# Patient Record
Sex: FEMALE | Race: WHITE | NOT HISPANIC OR LATINO | Employment: FULL TIME | URBAN - METROPOLITAN AREA
[De-identification: names, ages, dates, MRNs, and addresses within clinical notes are randomized per-mention and may not be internally consistent; named-entity substitution may affect disease eponyms.]

---

## 2021-10-16 ENCOUNTER — HOSPITAL ENCOUNTER (EMERGENCY)
Facility: HOSPITAL | Age: 21
Discharge: HOME | End: 2021-10-17
Attending: EMERGENCY MEDICINE
Payer: MEDICAID

## 2021-10-16 ENCOUNTER — APPOINTMENT (EMERGENCY)
Dept: RADIOLOGY | Facility: HOSPITAL | Age: 21
End: 2021-10-16
Payer: MEDICAID

## 2021-10-16 VITALS
OXYGEN SATURATION: 100 % | RESPIRATION RATE: 16 BRPM | TEMPERATURE: 98.6 F | WEIGHT: 129 LBS | HEART RATE: 84 BPM | SYSTOLIC BLOOD PRESSURE: 116 MMHG | DIASTOLIC BLOOD PRESSURE: 55 MMHG

## 2021-10-16 DIAGNOSIS — N39.0 UTI (URINARY TRACT INFECTION): ICD-10-CM

## 2021-10-16 DIAGNOSIS — R10.9 ABDOMINAL PAIN: Primary | ICD-10-CM

## 2021-10-16 DIAGNOSIS — B37.3 VAGINAL CANDIDIASIS: ICD-10-CM

## 2021-10-16 LAB
ALBUMIN SERPL BCP-MCNC: 4.7 G/DL (ref 3.5–5)
ALP SERPL-CCNC: 51 U/L (ref 46–116)
ALT SERPL W P-5'-P-CCNC: 25 U/L (ref 12–78)
ANION GAP SERPL CALCULATED.3IONS-SCNC: 13 MMOL/L (ref 4–13)
AST SERPL W P-5'-P-CCNC: 24 U/L (ref 5–45)
BACTERIA UR QL AUTO: ABNORMAL /HPF
BASOPHILS # BLD AUTO: 0.03 THOUSANDS/ΜL (ref 0–0.1)
BASOPHILS NFR BLD AUTO: 0 % (ref 0–1)
BILIRUB SERPL-MCNC: 0.3 MG/DL (ref 0.2–1)
BILIRUB UR QL STRIP: NEGATIVE
BUN SERPL-MCNC: 9 MG/DL (ref 5–25)
CALCIUM SERPL-MCNC: 9.3 MG/DL (ref 8.3–10.1)
CHLORIDE SERPL-SCNC: 104 MMOL/L (ref 100–108)
CLARITY UR: ABNORMAL
CO2 SERPL-SCNC: 24 MMOL/L (ref 21–32)
COLOR UR: YELLOW
CREAT SERPL-MCNC: 0.67 MG/DL (ref 0.6–1.3)
EOSINOPHIL # BLD AUTO: 0.13 THOUSAND/ΜL (ref 0–0.61)
EOSINOPHIL NFR BLD AUTO: 1 % (ref 0–6)
ERYTHROCYTE [DISTWIDTH] IN BLOOD BY AUTOMATED COUNT: 12.1 % (ref 11.6–15.1)
EXT PREG TEST URINE: NEGATIVE
EXT. CONTROL ED NAV: NORMAL
GFR SERPL CREATININE-BSD FRML MDRD: 127 ML/MIN/1.73SQ M
GLUCOSE SERPL-MCNC: 105 MG/DL (ref 65–140)
GLUCOSE UR STRIP-MCNC: NEGATIVE MG/DL
HCT VFR BLD AUTO: 44.4 % (ref 34.8–46.1)
HGB BLD-MCNC: 14.6 G/DL (ref 11.5–15.4)
HGB UR QL STRIP.AUTO: NEGATIVE
IMM GRANULOCYTES # BLD AUTO: 0.02 THOUSAND/UL (ref 0–0.2)
IMM GRANULOCYTES NFR BLD AUTO: 0 % (ref 0–2)
KETONES UR STRIP-MCNC: NEGATIVE MG/DL
LEUKOCYTE ESTERASE UR QL STRIP: ABNORMAL
LIPASE SERPL-CCNC: 107 U/L (ref 73–393)
LYMPHOCYTES # BLD AUTO: 3.31 THOUSANDS/ΜL (ref 0.6–4.47)
LYMPHOCYTES NFR BLD AUTO: 31 % (ref 14–44)
MAGNESIUM SERPL-MCNC: 2.4 MG/DL (ref 1.6–2.6)
MCH RBC QN AUTO: 30.6 PG (ref 26.8–34.3)
MCHC RBC AUTO-ENTMCNC: 32.9 G/DL (ref 31.4–37.4)
MCV RBC AUTO: 93 FL (ref 82–98)
MONOCYTES # BLD AUTO: 0.56 THOUSAND/ΜL (ref 0.17–1.22)
MONOCYTES NFR BLD AUTO: 5 % (ref 4–12)
MUCOUS THREADS UR QL AUTO: ABNORMAL
NEUTROPHILS # BLD AUTO: 6.68 THOUSANDS/ΜL (ref 1.85–7.62)
NEUTS SEG NFR BLD AUTO: 63 % (ref 43–75)
NITRITE UR QL STRIP: NEGATIVE
NON-SQ EPI CELLS URNS QL MICRO: ABNORMAL /HPF
NRBC BLD AUTO-RTO: 0 /100 WBCS
PH UR STRIP.AUTO: 7 [PH]
PLATELET # BLD AUTO: 231 THOUSANDS/UL (ref 149–390)
PMV BLD AUTO: 11 FL (ref 8.9–12.7)
POTASSIUM SERPL-SCNC: 4 MMOL/L (ref 3.5–5.3)
PROT SERPL-MCNC: 8.1 G/DL (ref 6.4–8.2)
PROT UR STRIP-MCNC: NEGATIVE MG/DL
RBC # BLD AUTO: 4.77 MILLION/UL (ref 3.81–5.12)
RBC #/AREA URNS AUTO: ABNORMAL /HPF
SODIUM SERPL-SCNC: 141 MMOL/L (ref 136–145)
SP GR UR STRIP.AUTO: 1.01 (ref 1–1.03)
UROBILINOGEN UR QL STRIP.AUTO: 0.2 E.U./DL
WBC # BLD AUTO: 10.73 THOUSAND/UL (ref 4.31–10.16)
WBC #/AREA URNS AUTO: ABNORMAL /HPF

## 2021-10-16 PROCEDURE — 99284 EMERGENCY DEPT VISIT MOD MDM: CPT | Performed by: EMERGENCY MEDICINE

## 2021-10-16 PROCEDURE — 83690 ASSAY OF LIPASE: CPT | Performed by: EMERGENCY MEDICINE

## 2021-10-16 PROCEDURE — 74177 CT ABD & PELVIS W/CONTRAST: CPT

## 2021-10-16 PROCEDURE — 96361 HYDRATE IV INFUSION ADD-ON: CPT

## 2021-10-16 PROCEDURE — 99284 EMERGENCY DEPT VISIT MOD MDM: CPT

## 2021-10-16 PROCEDURE — 83735 ASSAY OF MAGNESIUM: CPT | Performed by: EMERGENCY MEDICINE

## 2021-10-16 PROCEDURE — G1004 CDSM NDSC: HCPCS

## 2021-10-16 PROCEDURE — 80053 COMPREHEN METABOLIC PANEL: CPT | Performed by: EMERGENCY MEDICINE

## 2021-10-16 PROCEDURE — 96374 THER/PROPH/DIAG INJ IV PUSH: CPT

## 2021-10-16 PROCEDURE — 96375 TX/PRO/DX INJ NEW DRUG ADDON: CPT

## 2021-10-16 PROCEDURE — 81025 URINE PREGNANCY TEST: CPT | Performed by: EMERGENCY MEDICINE

## 2021-10-16 PROCEDURE — 81001 URINALYSIS AUTO W/SCOPE: CPT | Performed by: EMERGENCY MEDICINE

## 2021-10-16 PROCEDURE — 85025 COMPLETE CBC W/AUTO DIFF WBC: CPT | Performed by: EMERGENCY MEDICINE

## 2021-10-16 PROCEDURE — 36415 COLL VENOUS BLD VENIPUNCTURE: CPT

## 2021-10-16 RX ORDER — NITROFURANTOIN 25; 75 MG/1; MG/1
100 CAPSULE ORAL 2 TIMES DAILY
Qty: 10 CAPSULE | Refills: 0 | Status: SHIPPED | OUTPATIENT
Start: 2021-10-16

## 2021-10-16 RX ORDER — FLUCONAZOLE 100 MG/1
200 TABLET ORAL ONCE
Status: COMPLETED | OUTPATIENT
Start: 2021-10-16 | End: 2021-10-16

## 2021-10-16 RX ORDER — KETOROLAC TROMETHAMINE 30 MG/ML
15 INJECTION, SOLUTION INTRAMUSCULAR; INTRAVENOUS ONCE
Status: COMPLETED | OUTPATIENT
Start: 2021-10-16 | End: 2021-10-16

## 2021-10-16 RX ORDER — NORETHINDRONE ACETATE AND ETHINYL ESTRADIOL 1MG-20(21)
1 KIT ORAL DAILY
COMMUNITY

## 2021-10-16 RX ORDER — NITROFURANTOIN 25; 75 MG/1; MG/1
100 CAPSULE ORAL ONCE
Status: COMPLETED | OUTPATIENT
Start: 2021-10-16 | End: 2021-10-16

## 2021-10-16 RX ORDER — ONDANSETRON 2 MG/ML
4 INJECTION INTRAMUSCULAR; INTRAVENOUS ONCE
Status: COMPLETED | OUTPATIENT
Start: 2021-10-16 | End: 2021-10-16

## 2021-10-16 RX ADMIN — NITROFURANTOIN (MONOHYDRATE/MACROCRYSTALS) 100 MG: 75; 25 CAPSULE ORAL at 23:24

## 2021-10-16 RX ADMIN — IOHEXOL 100 ML: 350 INJECTION, SOLUTION INTRAVENOUS at 22:09

## 2021-10-16 RX ADMIN — FLUCONAZOLE 200 MG: 100 TABLET ORAL at 21:38

## 2021-10-16 RX ADMIN — ONDANSETRON 4 MG: 2 INJECTION INTRAMUSCULAR; INTRAVENOUS at 20:03

## 2021-10-16 RX ADMIN — KETOROLAC TROMETHAMINE 15 MG: 30 INJECTION, SOLUTION INTRAMUSCULAR; INTRAVENOUS at 20:38

## 2021-10-16 RX ADMIN — SODIUM CHLORIDE 1000 ML: 0.9 INJECTION, SOLUTION INTRAVENOUS at 19:58

## 2024-04-25 ENCOUNTER — OFFICE VISIT (OUTPATIENT)
Dept: URGENT CARE | Facility: CLINIC | Age: 24
End: 2024-04-25
Payer: MEDICAID

## 2024-04-25 VITALS
RESPIRATION RATE: 18 BRPM | HEIGHT: 64 IN | OXYGEN SATURATION: 99 % | DIASTOLIC BLOOD PRESSURE: 64 MMHG | BODY MASS INDEX: 21.17 KG/M2 | WEIGHT: 124 LBS | TEMPERATURE: 97 F | SYSTOLIC BLOOD PRESSURE: 119 MMHG | HEART RATE: 93 BPM

## 2024-04-25 DIAGNOSIS — J02.9 PHARYNGITIS, UNSPECIFIED ETIOLOGY: Primary | ICD-10-CM

## 2024-04-25 LAB — S PYO AG THROAT QL: NEGATIVE

## 2024-04-25 PROCEDURE — 99203 OFFICE O/P NEW LOW 30 MIN: CPT | Performed by: PHYSICIAN ASSISTANT

## 2024-04-25 PROCEDURE — 87880 STREP A ASSAY W/OPTIC: CPT | Performed by: PHYSICIAN ASSISTANT

## 2024-04-25 NOTE — PROGRESS NOTES
[facility]        NAME: Kimmy Whitaker is a 23 y.o. female  : 2000    MRN: [id number]  DATE: 2024  TIME: 7:37 PM    Assessment and Plan   Pharyngitis, unspecified etiology [J02.9]  1. Pharyngitis, unspecified etiology  POCT rapid ANTIGEN strepA    Upper Respiratory Culture        Neg strep, will send culture because of exposures at work. Discussed importance of staying hydrated. Discussed supportive care and otc meds for symptomatic relief. May use tea with honey and a cool mist humidifier for symptoms. Encouraged salt water gargles if sore throat. Discussed strict return to care precautions as well as red flag symptoms which should prompt immediate ED referral. Pt verbalized understanding and is in agreement with plan.  Please follow up with your primary care provider within the next week. Please remember that your visit today was with an urgent care provider and should not replace follow up with your primary care provider for chronic medical issues or annual physicals.       Patient Instructions       Follow up with PCP in 3-5 days.  Proceed to  ER if symptoms worsen.    If tests are performed, our office will contact you with results only if changes need to made to the care plan discussed with you at the visit. You can review your full results on [facility].    Chief Complaint     Chief Complaint   Patient presents with    Sore Throat     Sore  throat cough x 1 day         History of Present Illness       Kimmy Whitaker is a(n) 23 y.o. female presenting with URI symptoms x 1 days  Past medical history: asthma  Congestion: no  Sore throat: yes  Cough: yes mild  Sputum production: no  Fever: subjective but highest measured was only 99s  Body aches: no  Loss of smell/taste: no  GI symptoms: no  Known sick contacts: yes, works at a   OTC meds tried: tylenol  Also c/o bilateral ear fullness. Hurts to swallow but has no difficulty doing so        Review of Systems   Review  "of Systems   Constitutional:         Negative except as noted in HPI   Respiratory:  Negative for shortness of breath.    Cardiovascular:  Negative for chest pain.         Current Medications       Current Outpatient Medications:     norethindrone-ethinyl estradiol (JUNEL FE 1/20) 1-20 MG-MCG per tablet, Take 1 tablet by mouth daily, Disp: , Rfl:     nitrofurantoin (MACROBID) 100 mg capsule, Take 1 capsule (100 mg total) by mouth 2 (two) times a day (Patient not taking: Reported on 4/25/2024), Disp: 10 capsule, Rfl: 0    Current Allergies     Allergies as of 04/25/2024 - Reviewed 04/25/2024   Allergen Reaction Noted    Penicillins Hives 10/16/2021            The following portions of the patient's history were reviewed and updated as appropriate: allergies, current medications, past family history, past medical history, past social history, past surgical history and problem list.     Past Medical History:   Diagnosis Date    Asthma        History reviewed. No pertinent surgical history.    History reviewed. No pertinent family history.      Medications have been verified.        Objective   /64   Pulse 93   Temp (!) 97 °F (36.1 °C)   Resp 18   Ht 5' 4\" (1.626 m)   Wt 56.2 kg (124 lb)   LMP 03/12/2024   SpO2 99%   BMI 21.28 kg/m²        Physical Exam     Physical Exam  Vitals and nursing note reviewed.   Constitutional:       General: She is not in acute distress.     Appearance: Normal appearance. She is not toxic-appearing.   HENT:      Head: Normocephalic and atraumatic.      Right Ear: Tympanic membrane, ear canal and external ear normal.      Left Ear: Tympanic membrane, ear canal and external ear normal.      Nose: No congestion.      Mouth/Throat:      Mouth: Mucous membranes are moist.      Pharynx: Oropharynx is clear. Uvula midline. Posterior oropharyngeal erythema present. No oropharyngeal exudate.      Tonsils: No tonsillar exudate.   Eyes:      Conjunctiva/sclera: Conjunctivae normal.      " Pupils: Pupils are equal, round, and reactive to light.   Cardiovascular:      Rate and Rhythm: Normal rate and regular rhythm.      Heart sounds: Normal heart sounds.   Pulmonary:      Effort: Pulmonary effort is normal. No respiratory distress.      Breath sounds: Normal breath sounds. No wheezing, rhonchi or rales.   Musculoskeletal:      Cervical back: Normal range of motion and neck supple.   Lymphadenopathy:      Cervical: Cervical adenopathy present.   Skin:     General: Skin is warm and dry.      Capillary Refill: Capillary refill takes less than 2 seconds.   Neurological:      Mental Status: She is alert and oriented to person, place, and time.   Psychiatric:         Behavior: Behavior normal.

## 2024-04-26 ENCOUNTER — TELEPHONE (OUTPATIENT)
Dept: URGENT CARE | Facility: CLINIC | Age: 24
End: 2024-04-26

## 2024-04-26 NOTE — TELEPHONE ENCOUNTER
Pt called requesting test results. Given negative result. Educated tonsillar swelling in negative culture is likely viral. All questions answered.

## 2024-04-29 ENCOUNTER — OFFICE VISIT (OUTPATIENT)
Dept: URGENT CARE | Facility: CLINIC | Age: 24
End: 2024-04-29
Payer: MEDICAID

## 2024-04-29 VITALS
TEMPERATURE: 97 F | WEIGHT: 121.2 LBS | HEIGHT: 64 IN | HEART RATE: 103 BPM | OXYGEN SATURATION: 97 % | BODY MASS INDEX: 20.69 KG/M2 | RESPIRATION RATE: 18 BRPM | DIASTOLIC BLOOD PRESSURE: 61 MMHG | SYSTOLIC BLOOD PRESSURE: 132 MMHG

## 2024-04-29 DIAGNOSIS — H10.31 ACUTE CONJUNCTIVITIS OF RIGHT EYE, UNSPECIFIED ACUTE CONJUNCTIVITIS TYPE: Primary | ICD-10-CM

## 2024-04-29 LAB
BACTERIA SPEC RESP CULT: NORMAL
Lab: NORMAL

## 2024-04-29 PROCEDURE — 99213 OFFICE O/P EST LOW 20 MIN: CPT | Performed by: PHYSICIAN ASSISTANT

## 2024-04-29 RX ORDER — POLYMYXIN B SULFATE AND TRIMETHOPRIM 1; 10000 MG/ML; [USP'U]/ML
1 SOLUTION OPHTHALMIC EVERY 4 HOURS
Qty: 10 ML | Refills: 0 | Status: SHIPPED | OUTPATIENT
Start: 2024-04-29 | End: 2024-05-01

## 2024-04-29 NOTE — PROGRESS NOTES
[facility]        NAME: Kimmy Whitaker is a 23 y.o. female  : 2000    MRN: [id number]  DATE: 2024  TIME: 5:40 PM    Assessment and Plan   Acute conjunctivitis of right eye, unspecified acute conjunctivitis type [H10.31]  1. Acute conjunctivitis of right eye, unspecified acute conjunctivitis type  polymyxin b-trimethoprim (POLYTRIM) ophthalmic solution        Encouraged to wash hands frequently.  Work note provided. Discussed strict return to care precautions as well as red flag symptoms which should prompt immediate ED referral. Pt verbalized understanding and is in agreement with plan.  Please follow up with your primary care provider within the next week. Please remember that your visit today was with an urgent care provider and should not replace follow up with your primary care provider for chronic medical issues or annual physicals.       Patient Instructions       Follow up with PCP in 3-5 days.  Proceed to  ER if symptoms worsen.    If tests are performed, our office will contact you with results only if changes need to made to the care plan discussed with you at the visit. You [facility] on [facility].    Chief Complaint     Chief Complaint   Patient presents with    Cold Like Symptoms     Cold symptoms and conjunctivitis of right eye         History of Present Illness       Patient is a 23-year-old female with PMH asthma presenting with right eye erythema, discharge, irritation x 1 day.  Multiple exposures to pinkeye at work and at home.  States she has had a cough related to allergies for a few weeks but this has not worsened recently and she does not feel it is related to her eye.  Denies fevers, visual changes, headache, dizziness.        Review of Systems   Review of Systems   Constitutional:  Negative for chills, diaphoresis, fatigue and fever.   HENT:  Negative for congestion, ear pain, postnasal drip, rhinorrhea, sinus pain, sneezing, sore throat  "and trouble swallowing.    Eyes:  Positive for photophobia, pain, discharge and redness. Negative for visual disturbance.   Respiratory:  Positive for cough. Negative for chest tightness, shortness of breath and wheezing.    Cardiovascular:  Negative for chest pain and leg swelling.   Gastrointestinal:  Negative for diarrhea, nausea and vomiting.   Musculoskeletal:  Negative for myalgias.   Neurological:  Negative for dizziness, weakness and headaches.         Current Medications       Current Outpatient Medications:     polymyxin b-trimethoprim (POLYTRIM) ophthalmic solution, Administer 1 drop to the right eye every 4 (four) hours for 7 days, Disp: 10 mL, Rfl: 0    nitrofurantoin (MACROBID) 100 mg capsule, Take 1 capsule (100 mg total) by mouth 2 (two) times a day (Patient not taking: Reported on 4/25/2024), Disp: 10 capsule, Rfl: 0    norethindrone-ethinyl estradiol (JUNEL FE 1/20) 1-20 MG-MCG per tablet, Take 1 tablet by mouth daily, Disp: , Rfl:     Current Allergies     Allergies as of 04/29/2024 - Reviewed 04/29/2024   Allergen Reaction Noted    Penicillins Hives 10/16/2021            The following portions of the patient's history were reviewed and updated as appropriate: allergies, current medications, past family history, past medical history, past social history, past surgical history and problem list.     Past Medical History:   Diagnosis Date    Asthma        No past surgical history on file.    No family history on file.      Medications have been verified.        Objective   /61   Pulse 103   Temp (!) 97 °F (36.1 °C)   Resp 18   Ht 5' 4\" (1.626 m)   Wt 55 kg (121 lb 3.2 oz)   LMP 03/12/2024   SpO2 97%   BMI 20.80 kg/m²        Physical Exam     Physical Exam  Vitals and nursing note reviewed.   Constitutional:       General: She is not in acute distress.     Appearance: Normal appearance. She is not ill-appearing.   HENT:      Head: Normocephalic and atraumatic.   Eyes:      General: Lids " are normal.         Right eye: Discharge present. No hordeolum.         Left eye: No discharge or hordeolum.      Extraocular Movements: Extraocular movements intact.      Conjunctiva/sclera:      Right eye: Right conjunctiva is injected.      Left eye: Left conjunctiva is not injected.      Pupils: Pupils are equal, round, and reactive to light.   Cardiovascular:      Rate and Rhythm: Normal rate.   Pulmonary:      Effort: Pulmonary effort is normal. No respiratory distress.   Skin:     General: Skin is warm and dry.      Capillary Refill: Capillary refill takes less than 2 seconds.   Neurological:      Mental Status: She is alert and oriented to person, place, and time.   Psychiatric:         Behavior: Behavior normal.

## 2024-04-29 NOTE — LETTER
April 29, 2024     Patient: Kimmy Whitaker   YOB: 2000   Date of Visit: 4/29/2024       To Whom it May Concern:    Kimmy Whitaker was seen in my clinic on 4/29/2024. She may return to work on 5/1/2024 .    If you have any questions or concerns, please don't hesitate to call.         Sincerely,          Karuna Nur PA-C        CC: No Recipients

## 2024-05-01 ENCOUNTER — TELEPHONE (OUTPATIENT)
Dept: URGENT CARE | Facility: CLINIC | Age: 24
End: 2024-05-01

## 2024-05-01 DIAGNOSIS — H10.31 ACUTE CONJUNCTIVITIS OF RIGHT EYE, UNSPECIFIED ACUTE CONJUNCTIVITIS TYPE: Primary | ICD-10-CM

## 2024-05-01 RX ORDER — OFLOXACIN 3 MG/ML
1 SOLUTION/ DROPS OPHTHALMIC 4 TIMES DAILY
Qty: 5 ML | Refills: 0 | Status: SHIPPED | OUTPATIENT
Start: 2024-05-01 | End: 2024-05-08

## 2024-05-01 NOTE — TELEPHONE ENCOUNTER
PT CALLED office to speak with MADHAV Beckman: pt states her eyes are worsening, dry crust this morning, pt states not able to open eyes; informed pt to f/u with PCP with symptoms worsening; informed pt that Karuna will call her back to address needs.

## 2024-05-01 NOTE — TELEPHONE ENCOUNTER
Pt called stating her R eye is not getting any better. Advised her that we can try a different eye drop but if no better with this then it is likely either viral or allergic conjunctivitis. If no improvement or any worsening should fu with pcp or eye doctor. Pt verbalizes understanding and is in agreement with plan.

## 2024-12-09 ENCOUNTER — OFFICE VISIT (OUTPATIENT)
Dept: URGENT CARE | Facility: CLINIC | Age: 24
End: 2024-12-09
Payer: COMMERCIAL

## 2024-12-09 VITALS
BODY MASS INDEX: 20.49 KG/M2 | HEART RATE: 98 BPM | WEIGHT: 120 LBS | RESPIRATION RATE: 18 BRPM | HEIGHT: 64 IN | TEMPERATURE: 97.8 F | OXYGEN SATURATION: 100 %

## 2024-12-09 DIAGNOSIS — R42 DIZZINESS AND GIDDINESS: Primary | ICD-10-CM

## 2024-12-09 PROCEDURE — 99213 OFFICE O/P EST LOW 20 MIN: CPT

## 2024-12-09 RX ORDER — MECLIZINE HYDROCHLORIDE 25 MG/1
25 TABLET ORAL EVERY 8 HOURS PRN
Qty: 30 TABLET | Refills: 0 | Status: SHIPPED | OUTPATIENT
Start: 2024-12-09

## 2024-12-09 NOTE — PROGRESS NOTES
Assessment/Plan  EKG reviewed, no acute abnormality noted, awaiting official read.   Blood glucose 84 in office.   Orthostatic vital signs normal.   At this point, strongly recommend follow up with ENT/PCP as soon as possible. May begin Antivert as directed.   Go to ED for worsening symptoms.     Dizziness and giddiness [R42]  1. Dizziness and giddiness  Fingerstick Glucose (POCT)    ECG 12 lead    Ambulatory Referral to Otolaryngology    meclizine (ANTIVERT) 25 mg tablet    Ambulatory Referral to Physical Therapy      Patient Education     Dizziness, Adult ED   General Information   You came to the Emergency Department (ED) for dizziness. Dizziness means different things to different people. For example, you may feel light-headed or like you are about to pass out. You may have trouble walking straight and feel like you are about to fall when you are dizzy. Some people feel as though they are spinning or the world around them is spinning. This spinning feeling is known as vertigo.  Many things can cause you to feel dizzy. Some are serious things like heart problems or a stroke. Less serious things, like getting up too quickly or not drinking enough fluids, can also cause you to feel dizzy. Some medicines can also cause you to feel dizzy.  After seeing you, the doctor feels that the risk of a serious cause for your dizziness right now is low. If the doctor thinks you have vertigo, you may be given medicine to help with your symptoms.  What care is needed at home?   Call your regular doctor to let them know you were in the ED. Make a follow-up appointment if you were told to.  Avoid changing your position too quickly. Take care when moving from sitting to standing.  Sit on the edge of the bed for a few minutes before you stand up. Start to walk slowly after you stand up.  Move your legs often if you need to sit or  one position for a long time.  Be sure to drink enough fluids, even if you do not feel  thirsty.  Sit or lie down right away if you feel faint or dizzy. Take extra care to protect yourself from falls. Avoid driving when dizzy. If you feel dizzy while driving, pull over right away.  If you must walk somewhere when you feel dizzy, you may need to use a cane or walker, or have another person help you so you don’t fall.  When do I need to get emergency help?   Call for an ambulance right away if:  You have new weakness in your arms or legs.  You develop new trouble speaking, swallowing, seeing, or hearing.  You have chest pain, an irregular heartbeat, or trouble breathing.  You have a seizure.  You become unable to walk or stand because of your dizziness.  When do I need to call the doctor?   Your dizziness continues for a long time or gets worse.  You have new or worsening symptoms.  Last Reviewed Date   2020-09-16  Consumer Information Use and Disclaimer   This generalized information is a limited summary of diagnosis, treatment, and/or medication information. It is not meant to be comprehensive and should be used as a tool to help the user understand and/or assess potential diagnostic and treatment options. It does NOT include all information about conditions, treatments, medications, side effects, or risks that may apply to a specific patient. It is not intended to be medical advice or a substitute for the medical advice, diagnosis, or treatment of a health care provider based on the health care provider's examination and assessment of a patient’s specific and unique circumstances. Patients must speak with a health care provider for complete information about their health, medical questions, and treatment options, including any risks or benefits regarding use of medications. This information does not endorse any treatments or medications as safe, effective, or approved for treating a specific patient. [employer] and its affiliates disclaim any warranty or liability relating to this information or  the use thereof. The use of this information is governed by the Terms of Use, available at https://www.wolterskluwer.com/en/know/clinical-effectiveness-terms   Copyright   Copyright © 2024 [employer] and its affiliates and/or licensors. All rights reserved.         Subjective:     Patient ID: Kimmy Whitaker is a 24 y.o. female.      Reason For Visit / Chief Complaint  Chief Complaint   Patient presents with    Dizziness     This am became dizzy then hot and ears began to ring. Now feels nauseated with head hurting         Patient is a 24 year old female with no significant PMH who presents for evaluation of occipital headache and dizziness. She reports that symptoms began on Saturday with headache. This morning, she woke up and felt dizzy, and began experiencing hot flashes, nausea and bilateral tinnitus along with the episodes of dizziness. She has never had these symptoms before, but does note a family history of vertigo. She denies chest pain, palpitations, shortness of breath, vomiting, neck pain, fever, vision changes, chance of pregnancy.     Dizziness  Associated symptoms include headaches and nausea. Pertinent negatives include no arthralgias, chest pain, congestion, coughing, fatigue, fever, myalgias, neck pain, numbness, rash, sore throat or vomiting.           Past Medical History:   Diagnosis Date    Asthma        No past surgical history on file.    No family history on file.    Review of Systems   Constitutional:  Negative for fatigue and fever.   HENT:  Negative for congestion, ear discharge, ear pain, postnasal drip, rhinorrhea, sinus pressure, sinus pain, sneezing and sore throat.    Eyes: Negative.  Negative for pain, discharge, redness and itching.   Respiratory: Negative.  Negative for apnea, cough, choking, chest tightness, shortness of breath, wheezing and stridor.    Cardiovascular: Negative.  Negative for chest pain and palpitations.   Gastrointestinal:  Positive for nausea. Negative for  "diarrhea and vomiting.   Endocrine: Negative.  Negative for polydipsia, polyphagia and polyuria.   Genitourinary: Negative.  Negative for decreased urine volume and flank pain.   Musculoskeletal: Negative.  Negative for arthralgias, back pain, myalgias, neck pain and neck stiffness.   Skin: Negative.  Negative for color change and rash.   Allergic/Immunologic: Negative.  Negative for environmental allergies.   Neurological:  Positive for dizziness and headaches. Negative for facial asymmetry, light-headedness and numbness.   Hematological: Negative.  Negative for adenopathy.   Psychiatric/Behavioral: Negative.         Objective:    Pulse 98   Temp 97.8 °F (36.6 °C)   Resp 18   Ht 5' 4\" (1.626 m)   Wt 54.4 kg (120 lb)   LMP  (Exact Date)   SpO2 100%   BMI 20.60 kg/m²     Physical Exam  Vitals and nursing note reviewed.   Constitutional:       General: She is not in acute distress.     Appearance: Normal appearance. She is not ill-appearing, toxic-appearing or diaphoretic.      Interventions: She is not intubated.  HENT:      Head: Normocephalic and atraumatic.      Right Ear: Tympanic membrane, ear canal and external ear normal. There is no impacted cerumen.      Left Ear: Tympanic membrane, ear canal and external ear normal. There is no impacted cerumen.      Nose: Nose normal. No congestion or rhinorrhea.      Mouth/Throat:      Mouth: Mucous membranes are moist.   Eyes:      Extraocular Movements: Extraocular movements intact.      Conjunctiva/sclera: Conjunctivae normal.      Pupils: Pupils are equal, round, and reactive to light.   Cardiovascular:      Rate and Rhythm: Normal rate and regular rhythm.      Pulses: Normal pulses.      Heart sounds: Normal heart sounds, S1 normal and S2 normal. Heart sounds not distant. No murmur heard.     No friction rub. No gallop.   Pulmonary:      Effort: Pulmonary effort is normal. No tachypnea, bradypnea, accessory muscle usage, prolonged expiration, respiratory " distress or retractions. She is not intubated.      Breath sounds: Normal breath sounds. No stridor, decreased air movement or transmitted upper airway sounds. No decreased breath sounds, wheezing, rhonchi or rales.   Abdominal:      General: Bowel sounds are normal.      Palpations: Abdomen is soft.      Tenderness: There is no abdominal tenderness. There is no guarding or rebound.   Musculoskeletal:         General: Normal range of motion.      Cervical back: Normal range of motion and neck supple. No tenderness.   Skin:     General: Skin is warm and dry.      Capillary Refill: Capillary refill takes less than 2 seconds.   Neurological:      General: No focal deficit present.      Mental Status: She is alert and oriented to person, place, and time. Mental status is at baseline.      GCS: GCS eye subscore is 4. GCS verbal subscore is 5. GCS motor subscore is 6.      Cranial Nerves: Cranial nerves 2-12 are intact. No cranial nerve deficit.      Sensory: Sensation is intact.      Motor: Motor function is intact.      Coordination: Coordination is intact. Romberg sign negative. Coordination normal. Finger-Nose-Finger Test and Heel to Shin Test normal.      Gait: Gait is intact.      Comments: PERRLA, 3 mm bilaterally, mild unidirectional nystagmus with EOM.   Romberg negative, although patient noted slightly worsening dizziness during Romberg.    Psychiatric:         Mood and Affect: Mood normal.         Behavior: Behavior normal.

## 2024-12-09 NOTE — PATIENT INSTRUCTIONS
EKG reviewed, no acute abnormality noted, awaiting official read.   Blood glucose 84 in office.   At this point, strongly recommend follow up with ENT/PCP as soon as possible. May begin Antivert as directed.   Go to ED for worsening symptoms.

## 2024-12-11 LAB
ATRIAL RATE: 96 BPM
P AXIS: 58 DEGREES
PR INTERVAL: 148 MS
QRS AXIS: 42 DEGREES
QRSD INTERVAL: 108 MS
QT INTERVAL: 366 MS
QTC INTERVAL: 463 MS
T WAVE AXIS: 63 DEGREES
VENTRICULAR RATE: 96 BPM

## 2024-12-11 PROCEDURE — 93010 ELECTROCARDIOGRAM REPORT: CPT | Performed by: INTERNAL MEDICINE

## 2025-03-28 ENCOUNTER — HOSPITAL ENCOUNTER (EMERGENCY)
Facility: HOSPITAL | Age: 25
Discharge: HOME | End: 2025-03-28
Payer: COMMERCIAL

## 2025-03-28 ENCOUNTER — APPOINTMENT (EMERGENCY)
Dept: RADIOLOGY | Facility: HOSPITAL | Age: 25
End: 2025-03-28
Payer: COMMERCIAL

## 2025-03-28 VITALS
WEIGHT: 120 LBS | RESPIRATION RATE: 20 BRPM | SYSTOLIC BLOOD PRESSURE: 104 MMHG | TEMPERATURE: 98.5 F | HEART RATE: 70 BPM | OXYGEN SATURATION: 100 % | BODY MASS INDEX: 20.6 KG/M2 | DIASTOLIC BLOOD PRESSURE: 55 MMHG

## 2025-03-28 DIAGNOSIS — K52.9 COLITIS: Primary | ICD-10-CM

## 2025-03-28 LAB
ANION GAP SERPL CALCULATED.3IONS-SCNC: 9 MMOL/L (ref 4–13)
BASOPHILS # BLD AUTO: 0.02 THOUSANDS/ÂΜL (ref 0–0.1)
BASOPHILS NFR BLD AUTO: 0 % (ref 0–1)
BILIRUB UR QL STRIP: NEGATIVE
BUN SERPL-MCNC: 8 MG/DL (ref 5–25)
CALCIUM SERPL-MCNC: 8.6 MG/DL (ref 8.4–10.2)
CHLORIDE SERPL-SCNC: 108 MMOL/L (ref 96–108)
CLARITY UR: CLEAR
CO2 SERPL-SCNC: 21 MMOL/L (ref 21–32)
COLOR UR: COLORLESS
CREAT SERPL-MCNC: 0.65 MG/DL (ref 0.6–1.3)
EOSINOPHIL # BLD AUTO: 0.05 THOUSAND/ÂΜL (ref 0–0.61)
EOSINOPHIL NFR BLD AUTO: 1 % (ref 0–6)
ERYTHROCYTE [DISTWIDTH] IN BLOOD BY AUTOMATED COUNT: 14.9 % (ref 11.6–15.1)
EXT PREGNANCY TEST URINE: NEGATIVE
EXT. CONTROL: NORMAL
GFR SERPL CREATININE-BSD FRML MDRD: 125 ML/MIN/1.73SQ M
GLUCOSE SERPL-MCNC: 88 MG/DL (ref 65–140)
GLUCOSE UR STRIP-MCNC: NEGATIVE MG/DL
HCT VFR BLD AUTO: 35.5 % (ref 34.8–46.1)
HEMOCCULT STL QL IA: POSITIVE
HGB BLD-MCNC: 11.2 G/DL (ref 11.5–15.4)
HGB UR QL STRIP.AUTO: NEGATIVE
IMM GRANULOCYTES # BLD AUTO: 0.03 THOUSAND/UL (ref 0–0.2)
IMM GRANULOCYTES NFR BLD AUTO: 0 % (ref 0–2)
KETONES UR STRIP-MCNC: NEGATIVE MG/DL
LEUKOCYTE ESTERASE UR QL STRIP: NEGATIVE
LYMPHOCYTES # BLD AUTO: 1.19 THOUSANDS/ÂΜL (ref 0.6–4.47)
LYMPHOCYTES NFR BLD AUTO: 13 % (ref 14–44)
MCH RBC QN AUTO: 25.6 PG (ref 26.8–34.3)
MCHC RBC AUTO-ENTMCNC: 31.5 G/DL (ref 31.4–37.4)
MCV RBC AUTO: 81 FL (ref 82–98)
MONOCYTES # BLD AUTO: 0.6 THOUSAND/ÂΜL (ref 0.17–1.22)
MONOCYTES NFR BLD AUTO: 7 % (ref 4–12)
NEUTROPHILS # BLD AUTO: 7.16 THOUSANDS/ÂΜL (ref 1.85–7.62)
NEUTS SEG NFR BLD AUTO: 79 % (ref 43–75)
NITRITE UR QL STRIP: NEGATIVE
NRBC BLD AUTO-RTO: 0 /100 WBCS
PH UR STRIP.AUTO: 6 [PH]
PLATELET # BLD AUTO: 202 THOUSANDS/UL (ref 149–390)
PMV BLD AUTO: 10.8 FL (ref 8.9–12.7)
POTASSIUM SERPL-SCNC: 4.1 MMOL/L (ref 3.5–5.3)
PROT UR STRIP-MCNC: NEGATIVE MG/DL
RBC # BLD AUTO: 4.37 MILLION/UL (ref 3.81–5.12)
SODIUM SERPL-SCNC: 138 MMOL/L (ref 135–147)
SP GR UR STRIP.AUTO: 1.01 (ref 1–1.03)
UROBILINOGEN UR STRIP-ACNC: <2 MG/DL
WBC # BLD AUTO: 9.05 THOUSAND/UL (ref 4.31–10.16)

## 2025-03-28 PROCEDURE — 96375 TX/PRO/DX INJ NEW DRUG ADDON: CPT

## 2025-03-28 PROCEDURE — 36415 COLL VENOUS BLD VENIPUNCTURE: CPT | Performed by: PHYSICIAN ASSISTANT

## 2025-03-28 PROCEDURE — 80048 BASIC METABOLIC PNL TOTAL CA: CPT | Performed by: PHYSICIAN ASSISTANT

## 2025-03-28 PROCEDURE — 96374 THER/PROPH/DIAG INJ IV PUSH: CPT

## 2025-03-28 PROCEDURE — 96361 HYDRATE IV INFUSION ADD-ON: CPT

## 2025-03-28 PROCEDURE — 85025 COMPLETE CBC W/AUTO DIFF WBC: CPT | Performed by: PHYSICIAN ASSISTANT

## 2025-03-28 PROCEDURE — G0328 FECAL BLOOD SCRN IMMUNOASSAY: HCPCS | Performed by: PHYSICIAN ASSISTANT

## 2025-03-28 PROCEDURE — 99284 EMERGENCY DEPT VISIT MOD MDM: CPT

## 2025-03-28 PROCEDURE — 87505 NFCT AGENT DETECTION GI: CPT | Performed by: PHYSICIAN ASSISTANT

## 2025-03-28 PROCEDURE — 74177 CT ABD & PELVIS W/CONTRAST: CPT

## 2025-03-28 PROCEDURE — 81003 URINALYSIS AUTO W/O SCOPE: CPT | Performed by: PHYSICIAN ASSISTANT

## 2025-03-28 PROCEDURE — 81025 URINE PREGNANCY TEST: CPT | Performed by: PHYSICIAN ASSISTANT

## 2025-03-28 PROCEDURE — 99284 EMERGENCY DEPT VISIT MOD MDM: CPT | Performed by: PHYSICIAN ASSISTANT

## 2025-03-28 RX ORDER — FAMOTIDINE 10 MG/ML
20 INJECTION, SOLUTION INTRAVENOUS ONCE
Status: COMPLETED | OUTPATIENT
Start: 2025-03-28 | End: 2025-03-28

## 2025-03-28 RX ORDER — KETOROLAC TROMETHAMINE 30 MG/ML
15 INJECTION, SOLUTION INTRAMUSCULAR; INTRAVENOUS ONCE
Status: COMPLETED | OUTPATIENT
Start: 2025-03-28 | End: 2025-03-28

## 2025-03-28 RX ORDER — METRONIDAZOLE 500 MG/1
500 TABLET ORAL EVERY 12 HOURS SCHEDULED
Qty: 20 TABLET | Refills: 0 | Status: SHIPPED | OUTPATIENT
Start: 2025-03-28 | End: 2025-04-07

## 2025-03-28 RX ORDER — FAMOTIDINE 20 MG/1
20 TABLET, FILM COATED ORAL 2 TIMES DAILY
Qty: 30 TABLET | Refills: 0 | Status: SHIPPED | OUTPATIENT
Start: 2025-03-28

## 2025-03-28 RX ORDER — CIPROFLOXACIN 500 MG/1
500 TABLET, FILM COATED ORAL 2 TIMES DAILY
Qty: 20 TABLET | Refills: 0 | Status: SHIPPED | OUTPATIENT
Start: 2025-03-28 | End: 2025-04-07

## 2025-03-28 RX ADMIN — FAMOTIDINE 20 MG: 10 INJECTION, SOLUTION INTRAVENOUS at 08:47

## 2025-03-28 RX ADMIN — IOHEXOL 100 ML: 350 INJECTION, SOLUTION INTRAVENOUS at 09:31

## 2025-03-28 RX ADMIN — KETOROLAC TROMETHAMINE 15 MG: 30 INJECTION, SOLUTION INTRAMUSCULAR; INTRAVENOUS at 11:02

## 2025-03-28 RX ADMIN — SODIUM CHLORIDE 1000 ML: 0.9 INJECTION, SOLUTION INTRAVENOUS at 08:47

## 2025-03-28 NOTE — ED PROVIDER NOTES
Time reflects when diagnosis was documented in both MDM as applicable and the Disposition within this note       Time User Action Codes Description Comment    3/28/2025 10:37 AM JonathonShirleyy Add [K52.9] Colitis           ED Disposition       ED Disposition   Discharge    Condition   Stable    Date/Time   Fri Mar 28, 2025 10:36 AM    Comment   Kimmy Whitaker discharge to home/self care.                   Assessment & Plan       Medical Decision Making  Thorough conversation with patient regarding lab work and imaging studies.  Colitis noted on CT imaging, patient has not had any blood in the stool however is Hemoccult positive.  No family history of Crohn's or ulcerative colitis however patient has long history of IBS. Would like patient to f/u with GI as patient may need colonoscopy at some point. Patient is feeling better with fluids and Pepcid.  She was able to give a stool sample which was sent out for enteric bacteria and cdif. Discussed appropriate diet for the next few days and increase hydration.     Patient is informed to return to the emergency department for worsening of symptoms and was given proper education regarding their diagnosis and symptoms. Otherwise the patient is informed to follow up with their primary care doctor for re-evaluation. The patient verbalizes understanding and agrees with above assessment and plan. All questions were answered.          Amount and/or Complexity of Data Reviewed  Labs: ordered.  Radiology: ordered.    Risk  Prescription drug management.        ED Course as of 03/28/25 1149   Fri Mar 28, 2025   0925 Patient feels slightly better. Will continue to hydrate. Appears more comfortable, going to CT scan.        Medications   sodium chloride 0.9 % bolus 1,000 mL (0 mL Intravenous Stopped 3/28/25 0947)   Famotidine (PF) (PEPCID) injection 20 mg (20 mg Intravenous Given 3/28/25 0847)   iohexol (OMNIPAQUE) 350 MG/ML injection (MULTI-DOSE) 100 mL (100 mL Intravenous Given  3/28/25 0931)   ketorolac (TORADOL) injection 15 mg (15 mg Intravenous Given 3/28/25 1102)       ED Risk Strat Scores                            SBIRT 20yo+      Flowsheet Row Most Recent Value   Initial Alcohol Screen: US AUDIT-C     1. How often do you have a drink containing alcohol? 0 Filed at: 03/28/2025 0749   2. How many drinks containing alcohol do you have on a typical day you are drinking?  0 Filed at: 03/28/2025 0749   3a. Male UNDER 65: How often do you have five or more drinks on one occasion? 0 Filed at: 03/28/2025 0749   3b. FEMALE Any Age, or MALE 65+: How often do you have 4 or more drinks on one occassion? 0 Filed at: 03/28/2025 0749   Audit-C Score 0 Filed at: 03/28/2025 0749                            History of Present Illness       Chief Complaint   Patient presents with    Diarrhea     Has had diarrhea and abdominal pain and diarrhea since last night. Hx. Of IBS       Past Medical History:   Diagnosis Date    Asthma       History reviewed. No pertinent surgical history.   History reviewed. No pertinent family history.   Social History     Tobacco Use    Smoking status: Former     Current packs/day: 0.25     Types: Cigarettes    Smokeless tobacco: Never   Vaping Use    Vaping status: Never Used   Substance Use Topics    Alcohol use: Not Currently    Drug use: Not Currently      E-Cigarette/Vaping    E-Cigarette Use Never User       E-Cigarette/Vaping Substances    Nicotine Yes     THC No     CBD No     Flavoring Yes     Other No     Unknown No       I have reviewed and agree with the history as documented.     24-year-old female presenting today with diarrhea that began yesterday accompanied with severe abdominal cramping that seems primarily to be on the left side of the abdomen.  Patient relays that she has a long history of IBS, this feels similar however more severe and persistent than her usual.  She is not on any medication for her IBS.  Has not been on any recent antibiotics, has had  approximately 15 episodes of diarrhea.  She does not usually experience constipation with her IBS.  Diarrhea is without strange color or odor or blood noted by the patient.  She also notes left lower back pain.  Denies any history of ovarian cyst.  Currently on birth control denies any pregnancy currently.  Denies nausea, vomiting, cough, congestion, fevers, changes in urination etc.  Differential includes but is not limited to IBS, colitis, etc.        Review of Systems   Constitutional: Negative.  Negative for chills, fatigue and fever.   HENT: Negative.  Negative for congestion, postnasal drip, rhinorrhea and sore throat.    Eyes: Negative.    Respiratory: Negative.  Negative for cough, shortness of breath and wheezing.    Cardiovascular: Negative.    Gastrointestinal:  Positive for abdominal pain and diarrhea. Negative for abdominal distention, anal bleeding, blood in stool, constipation, nausea, rectal pain and vomiting.   Endocrine: Negative.    Genitourinary: Negative.    Musculoskeletal: Negative.    Skin: Negative.    Neurological: Negative.    Hematological: Negative.    Psychiatric/Behavioral: Negative.     All other systems reviewed and are negative.          Objective       ED Triage Vitals   Temperature Pulse Blood Pressure Respirations SpO2 Patient Position - Orthostatic VS   03/28/25 0747 03/28/25 0747 03/28/25 0747 03/28/25 0747 03/28/25 0747 --   98.5 °F (36.9 °C) 94 136/66 20 99 %       Temp src Heart Rate Source BP Location FiO2 (%) Pain Score    -- 03/28/25 0900 03/28/25 0900 -- 03/28/25 0747     Monitor Right arm  10 - Worst Possible Pain      Vitals      Date and Time Temp Pulse SpO2 Resp BP Pain Score FACES Pain Rating User   03/28/25 1102 -- -- -- -- -- 8 -- CS   03/28/25 1000 -- 70 100 % 20 104/55 -- --    03/28/25 0900 -- 79 97 % 20 120/62 -- --    03/28/25 0747 98.5 °F (36.9 °C) 94 99 % 20 136/66 10 - Worst Possible Pain -- LIYAH            Physical Exam  Vitals and nursing note  reviewed.   Constitutional:       General: She is not in acute distress.     Appearance: Normal appearance. She is well-developed and normal weight. She is not diaphoretic.   HENT:      Head: Normocephalic and atraumatic.      Right Ear: External ear normal.      Left Ear: External ear normal.      Nose: Nose normal.      Mouth/Throat:      Pharynx: No oropharyngeal exudate.   Eyes:      General: No scleral icterus.        Right eye: No discharge.         Left eye: No discharge.      Conjunctiva/sclera: Conjunctivae normal.      Pupils: Pupils are equal, round, and reactive to light.   Cardiovascular:      Rate and Rhythm: Normal rate and regular rhythm.      Pulses: Normal pulses.      Heart sounds: Normal heart sounds. No murmur heard.     No friction rub. No gallop.   Pulmonary:      Effort: Pulmonary effort is normal. No respiratory distress.      Breath sounds: Normal breath sounds. No stridor. No wheezing, rhonchi or rales.   Chest:      Chest wall: No tenderness.   Abdominal:      General: Abdomen is flat. Bowel sounds are normal. There is no distension.      Palpations: Abdomen is soft. There is no mass.      Tenderness: There is abdominal tenderness. There is no right CVA tenderness, left CVA tenderness, guarding or rebound.      Hernia: No hernia is present.       Musculoskeletal:        Arms:       Cervical back: Normal range of motion and neck supple.   Lymphadenopathy:      Cervical: No cervical adenopathy.   Skin:     General: Skin is warm and dry.      Capillary Refill: Capillary refill takes less than 2 seconds.      Coloration: Skin is not pale.      Findings: No erythema or rash.   Neurological:      General: No focal deficit present.      Mental Status: She is alert and oriented to person, place, and time. Mental status is at baseline.   Psychiatric:         Thought Content: Thought content normal.         Judgment: Judgment normal.         Results Reviewed       Procedure Component Value Units  Date/Time    iFOBT (FIT) [[id number]]  (Abnormal) Collected: 03/28/25 1102    Lab Status: Final result Specimen: Stool from Per Rectum Updated: 03/28/25 1128     OCCULT BLD, FECAL IMMUNOLOGICAL Positive    Narrative:        Performed by Fecal Immunochemical Test.    Stool Enteric Bacterial Panel by PCR [[id number]] Collected: 03/28/25 1102    Lab Status: In process Specimen: Stool from Rectum Updated: 03/28/25 1123    Clostridium difficile toxin by PCR with EIA [[id number]] Collected: 03/28/25 1102    Lab Status: In process Specimen: Stool from Per Rectum Updated: 03/28/25 1113    Basic metabolic panel [[id number]] Collected: 03/28/25 0844    Lab Status: Final result Specimen: Blood from Arm, Left Updated: 03/28/25 0919     Sodium 138 mmol/L      Potassium 4.1 mmol/L      Chloride 108 mmol/L      CO2 21 mmol/L      ANION GAP 9 mmol/L      BUN 8 mg/dL      Creatinine 0.65 mg/dL      Glucose 88 mg/dL      Calcium 8.6 mg/dL      eGFR 125 ml/min/1.73sq m     Narrative:      National Kid[employer] guidelines for Chronic Kidney Disease (CKD):     Stage 1 with normal or high GFR (GFR > 90 mL/min/1.73 square meters)    Stage 2 Mild CKD (GFR = 60-89 mL/min/1.73 square meters)    Stage 3A Moderate CKD (GFR = 45-59 mL/min/1.73 square meters)    Stage 3B Moderate CKD (GFR = 30-44 mL/min/1.73 square meters)    Stage 4 Severe CKD (GFR = 15-29 mL/min/1.73 square meters)    Stage 5 End Stage CKD (GFR <15 mL/min/1.73 square meters)  Note: GFR calculation is accurate only with a steady state creatinine    UA w Reflex to Microscopic w Reflex to Culture [[id number]] Collected: 03/28/25 0844    Lab Status: Final result Specimen: Urine, Clean Catch Updated: 03/28/25 0914     Color, UA Colorless     Clarity, UA Clear     Specific Gravity, UA 1.010     pH, UA 6.0     Leukocytes, UA Negative     Nitrite, UA Negative     Protein, UA Negative mg/dl      Glucose, UA Negative mg/dl      Ketones, UA Negative mg/dl      Urobilinogen, UA  <2.0 mg/dl      Bilirubin, UA Negative     Occult Blood, UA Negative    CBC and differential [[id number]]  (Abnormal) Collected: 03/28/25 0844    Lab Status: Final result Specimen: Blood from Arm, Left Updated: 03/28/25 0903     WBC 9.05 Thousand/uL      RBC 4.37 Million/uL      Hemoglobin 11.2 g/dL      Hematocrit 35.5 %      MCV 81 fL      MCH 25.6 pg      MCHC 31.5 g/dL      RDW 14.9 %      MPV 10.8 fL      Platelets 202 Thousands/uL      nRBC 0 /100 WBCs      Segmented % 79 %      Immature Grans % 0 %      Lymphocytes % 13 %      Monocytes % 7 %      Eosinophils Relative 1 %      Basophils Relative 0 %      Absolute Neutrophils 7.16 Thousands/µL      Absolute Immature Grans 0.03 Thousand/uL      Absolute Lymphocytes 1.19 Thousands/µL      Absolute Monocytes 0.60 Thousand/µL      Eosinophils Absolute 0.05 Thousand/µL      Basophils Absolute 0.02 Thousands/µL     POCT pregnancy, urine [[id number]]  (Normal) Collected: 03/28/25 0841    Lab Status: Final result Updated: 03/28/25 0841     EXT Preg Test, Ur Negative     Control Valid            CT abdomen pelvis with contrast   Final Interpretation by Filiberto Sutherland MD (03/28 0952)      1.  Findings consistent with segmental colitis involving the descending colon, sigmoid colon, and rectum.   2.  Small volume pelvic ascites.         Workstation performed: [id number]             Procedures    ED Medication and Procedure Management   Prior to Admission Medications   Prescriptions Last Dose Informant Patient Reported? Taking?   meclizine (ANTIVERT) 25 mg tablet   No No   Sig: Take 1 tablet (25 mg total) by mouth every 8 (eight) hours as needed for dizziness   nitrofurantoin (MACROBID) 100 mg capsule   No No   Sig: Take 1 capsule (100 mg total) by mouth 2 (two) times a day   Patient not taking: Reported on 12/9/2024   norethindrone-ethinyl estradiol (JUNEL FE 1/20) 1-20 MG-MCG per tablet   Yes No   Sig: Take 1 tablet by mouth daily      Facility-Administered  Medications: None     Discharge Medication List as of 3/28/2025 10:47 AM        START taking these medications    Details   ciprofloxacin (CIPRO) 500 mg tablet Take 1 tablet (500 mg total) by mouth 2 (two) times a day for 10 days, Starting Fri 3/28/2025, Until Mon 4/7/2025, Normal      famotidine (PEPCID) 20 mg tablet Take 1 tablet (20 mg total) by mouth 2 (two) times a day, Starting Fri 3/28/2025, Normal      metroNIDAZOLE (FLAGYL) 500 mg tablet Take 1 tablet (500 mg total) by mouth every 12 (twelve) hours for 10 days, Starting Fri 3/28/2025, Until Mon 4/7/2025, Normal           CONTINUE these medications which have NOT CHANGED    Details   meclizine (ANTIVERT) 25 mg tablet Take 1 tablet (25 mg total) by mouth every 8 (eight) hours as needed for dizziness, Starting Mon 12/9/2024, Normal      nitrofurantoin (MACROBID) 100 mg capsule Take 1 capsule (100 mg total) by mouth 2 (two) times a day, Starting Sat 10/16/2021, Normal      norethindrone-ethinyl estradiol (JUNEL FE 1/20) 1-20 MG-MCG per tablet Take 1 tablet by mouth daily, Historical Med           No discharge procedures on file.  ED SEPSIS DOCUMENTATION   Time reflects when diagnosis was documented in both MDM as applicable and the Disposition within this note       Time User Action Codes Description Comment    3/28/2025 10:37 AM Marija Holt Add [K52.9] Colitis                  Marija Holt PA-C  03/28/25 1149

## 2025-03-28 NOTE — Clinical Note
Kimmy Whitaker was seen and treated in our emergency department on 3/28/2025.                Diagnosis: colitis    Kimmy  may return to work on return date.    She may return on this date: 04/03/2025         If you have any questions or concerns, please don't hesitate to call.      Marija Holt PA-C    ______________________________           _______________          _______________  Hospital Representative                              Date                                Time

## 2025-03-29 LAB
C COLI+JEJUNI TUF STL QL NAA+PROBE: NEGATIVE
C DIFF TOX GENS STL QL NAA+PROBE: NEGATIVE
EC STX1+STX2 GENES STL QL NAA+PROBE: NEGATIVE
SALMONELLA SP SPAO STL QL NAA+PROBE: NEGATIVE
SHIGELLA SP+EIEC IPAH STL QL NAA+PROBE: NEGATIVE

## 2025-04-11 ENCOUNTER — APPOINTMENT (OUTPATIENT)
Dept: LAB | Facility: CLINIC | Age: 25
End: 2025-04-11
Attending: NURSE PRACTITIONER
Payer: COMMERCIAL

## 2025-04-11 ENCOUNTER — OFFICE VISIT (OUTPATIENT)
Age: 25
End: 2025-04-11
Payer: COMMERCIAL

## 2025-04-11 VITALS
WEIGHT: 113.6 LBS | SYSTOLIC BLOOD PRESSURE: 112 MMHG | HEIGHT: 64 IN | DIASTOLIC BLOOD PRESSURE: 76 MMHG | BODY MASS INDEX: 19.39 KG/M2 | HEART RATE: 113 BPM

## 2025-04-11 DIAGNOSIS — R93.3 ABNORMAL CT SCAN, COLON: ICD-10-CM

## 2025-04-11 DIAGNOSIS — R93.3 ABNORMAL CT SCAN, COLON: Primary | ICD-10-CM

## 2025-04-11 DIAGNOSIS — R10.9 ABDOMINAL CRAMPING: ICD-10-CM

## 2025-04-11 DIAGNOSIS — R19.7 DIARRHEA, UNSPECIFIED TYPE: ICD-10-CM

## 2025-04-11 PROCEDURE — 87209 SMEAR COMPLEX STAIN: CPT

## 2025-04-11 PROCEDURE — 99204 OFFICE O/P NEW MOD 45 MIN: CPT | Performed by: NURSE PRACTITIONER

## 2025-04-11 PROCEDURE — 87493 C DIFF AMPLIFIED PROBE: CPT

## 2025-04-11 PROCEDURE — 87177 OVA AND PARASITES SMEARS: CPT

## 2025-04-11 PROCEDURE — 87329 GIARDIA AG IA: CPT

## 2025-04-11 RX ORDER — COLESEVELAM 180 1/1
1250 TABLET ORAL 2 TIMES DAILY WITH MEALS
Qty: 120 TABLET | Refills: 0 | Status: SHIPPED | OUTPATIENT
Start: 2025-04-11

## 2025-04-11 RX ORDER — DICYCLOMINE HCL 20 MG
20 TABLET ORAL 2 TIMES DAILY PRN
Qty: 60 TABLET | Refills: 1 | Status: SHIPPED | OUTPATIENT
Start: 2025-04-11

## 2025-04-11 RX ORDER — ALBUTEROL SULFATE 90 UG/1
2 INHALANT RESPIRATORY (INHALATION) EVERY 4 HOURS PRN
COMMUNITY

## 2025-04-11 RX ORDER — DICYCLOMINE HCL 20 MG
20 TABLET ORAL 2 TIMES DAILY
Qty: 60 TABLET | Refills: 1 | Status: SHIPPED | OUTPATIENT
Start: 2025-04-11 | End: 2025-04-11

## 2025-04-11 RX ORDER — POLYETHYLENE GLYCOL 3350, SODIUM SULFATE ANHYDROUS, SODIUM BICARBONATE, SODIUM CHLORIDE, POTASSIUM CHLORIDE 236; 22.74; 6.74; 5.86; 2.97 G/4L; G/4L; G/4L; G/4L; G/4L
4000 POWDER, FOR SOLUTION ORAL ONCE
Qty: 4000 ML | Refills: 0 | Status: SHIPPED | OUTPATIENT
Start: 2025-04-11 | End: 2025-04-11

## 2025-04-11 NOTE — PROGRESS NOTES
Name: Kimmy Whitaker      : 2000      MRN: [id number]  Encounter Provider: CHICHI Alford  Encounter Date: 2025   Encounter department: [facility]  :  Assessment & Plan  Abnormal CT scan, colon  This is a 24-year-old female with history of tobacco use and GI issues since she was 13 with previous diagnosis of IBS (previously had abdominal cramping/diarrhea, but then was having bowel movements every 2 days after the birth of her son about 15 months ago) who presents for ED follow-up for colitis.  She reports the acute onset of abdominal cramping and diarrhea prompting presentation to the ED 3/28 where she was found with segmental colitis involving the descending colon, sigmoid colon and rectum on CAT scan.  WBC normal but hemoglobin was noted to be 11.2 and occult stool performed was positive.  Stool studies including C. difficile and enteric panel were negative.  She was treated with a 10-day course of ciprofloxacin and Flagyl which improved her symptoms to 5 bowel movements a day with more mushy consistency with last dose on Monday.  This morning, she had acute worsening of symptoms with worsening abdominal cramping and frequent liquid stool-has already gone 10 times this morning.  She denies any rectal bleeding or nocturnal symptoms or nausea orvomiting.  She is able to stay well-hydrated.  Symptoms may be secondary to postinfectious IBS, C. difficile after recent antibiotic use, new diagnosis IBD.    - Obtain repeat stool studies to include a C. difficile panel given recent antibiotic use, will also obtain an ova and parasite and Giardia stool test  - Recheck CBC, CMP, will also check CRP and TSH, as well as a celiac panel  - Start probiotic  - Start Bentyl 20 mg as needed for abdominal cramping, side effect profile discussed. Will also give WelChol 2 tablets twice a day, that it is important to take her birth control at least 4 hours prior to taking  this as it can make her birth control less effective.  -Stay well hydrated, follow a bland low residue diet  - Will schedule colonoscopy in 4 to 6 weeks for further evaluation.  If celiac panel is positive we will obtain EGD as well.  Prep and procedure explained  - ED precautions given    Orders:    CBC (Includes Diff/Plt) (Refl); Future    Comprehensive metabolic panel; Future    TSH, 3rd generation with Free T4 reflex; Future    Clostridioides difficile toxin by PCR with EIA; Future    Ova and parasite examination; Future    Giardia antigen; Future    C-reactive protein; Future    Colonoscopy; Future    polyethylene glycol (Golytely) 4000 mL solution; Take 4,000 mL by mouth once for 1 dose Take 4000 mL by mouth once for 1 dose. Use as directed    Diarrhea, unspecified type    Orders:    CBC (Includes Diff/Plt) (Refl); Future    Comprehensive metabolic panel; Future    TSH, 3rd generation with Free T4 reflex; Future    Clostridioides difficile toxin by PCR with EIA; Future    Ova and parasite examination; Future    Giardia antigen; Future    C-reactive protein; Future    colesevelam (WELCHOL) 625 mg tablet; Take 2 tablets (1,250 mg total) by mouth 2 (two) times a day with meals    dicyclomine (BENTYL) 20 mg tablet; Take 1 tablet (20 mg total) by mouth 2 (two) times a day as needed (abdominal cramping)    Colonoscopy; Future    polyethylene glycol (Golytely) 4000 mL solution; Take 4,000 mL by mouth once for 1 dose Take 4000 mL by mouth once for 1 dose. Use as directed    Celiac Panel/Adult; Future    Abdominal cramping    Orders:    dicyclomine (BENTYL) 20 mg tablet; Take 1 tablet (20 mg total) by mouth 2 (two) times a day as needed (abdominal cramping)    Colonoscopy; Future    polyethylene glycol (Golytely) 4000 mL solution; Take 4,000 mL by mouth once for 1 dose Take 4000 mL by mouth once for 1 dose. Use as directed        History of Present Illness   Kimmy Whitaker is a 24 y.o. female who presents for ED  "follow-up.  She presented to the ED on 3/28 due to abdominal cramping and profuse diarrhea and was found with no leukocytosis but segmental colitis on CAT scan, hemoglobin noted to be 11.2, and with occult positive stool.  She had C. difficile PCR and enteric panel stool studies performed which came back negative and she was prescribed a 10-day course of ciprofloxacin and Flagyl.  She reports that symptoms improved less frequent movements about 5 times a day with more mushy consistency and less abdominal cramping following the antibiotics.  However symptoms worsened again this morning and she had upwards of 10 liquid bowel movements so far today with abdominal cramping.  She denies any nausea or vomiting, dizziness or lightheadedness, fever/chills, nocturnal symptoms, or rectal bleeding.    She reports she has had GI issues since she was 13 and was diagnosed previously with IBS due to history of abdominal cramping and diarrhea, but then the abdominal cramping went away, and after the birth of her son about 15 months ago she started moving her bowels every other day without any abdominal pain or discomfort.    She denies any family history of colorectal cancer, Crohn's disease or ulcerative colitis.    She vapes.  Reports very minimal alcohol use.  Denies any recreational drug use.        HPI    Review of Systems A complete review of systems is negative other than that noted above in the HPI.         Objective   /76 (BP Location: Left arm, Patient Position: Sitting, Cuff Size: Standard)   Pulse (!) 113   Ht 5' 4\" (1.626 m)   Wt 51.5 kg (113 lb 9.6 oz)   BMI 19.50 kg/m²     Physical Exam  Vitals and nursing note reviewed.   Constitutional:       General: She is not in acute distress.     Appearance: She is well-developed and underweight.   HENT:      Head: Normocephalic and atraumatic.   Eyes:      Conjunctiva/sclera: Conjunctivae normal.   Cardiovascular:      Rate and Rhythm: Normal rate and regular " rhythm.      Heart sounds: No murmur heard.  Pulmonary:      Effort: Pulmonary effort is normal. No respiratory distress.      Breath sounds: Normal breath sounds.   Abdominal:      Palpations: Abdomen is soft.      Tenderness: There is abdominal tenderness in the right lower quadrant and left lower quadrant.   Musculoskeletal:         General: No swelling.      Cervical back: Neck supple.   Skin:     General: Skin is warm and dry.      Capillary Refill: Capillary refill takes less than 2 seconds.   Neurological:      Mental Status: She is alert and oriented to person, place, and time.   Psychiatric:         Mood and Affect: Mood normal.            Lab Results: I personally reviewed relevant lab results.

## 2025-04-11 NOTE — PATIENT INSTRUCTIONS
Scheduled date of colonoscopy (as of today): May 30, 2025  Physician performing colonoscopy: Dr. Yeager  Location of colonoscopy:[facility]  Bowel prep reviewed with patient: Golytely  Instructions reviewed with patient by: JENNIFER  Clearances: NA

## 2025-04-12 LAB
C DIFF TOX A+B STL QL IA: POSITIVE
C DIFF TOX GENS STL QL NAA+PROBE: POSITIVE
G LAMBLIA AG STL QL IA: NEGATIVE

## 2025-04-14 ENCOUNTER — APPOINTMENT (EMERGENCY)
Dept: RADIOLOGY | Facility: HOSPITAL | Age: 25
End: 2025-04-14
Payer: COMMERCIAL

## 2025-04-14 ENCOUNTER — HOSPITAL ENCOUNTER (EMERGENCY)
Facility: HOSPITAL | Age: 25
Discharge: HOME | End: 2025-04-14
Attending: EMERGENCY MEDICINE
Payer: COMMERCIAL

## 2025-04-14 VITALS
SYSTOLIC BLOOD PRESSURE: 125 MMHG | RESPIRATION RATE: 18 BRPM | TEMPERATURE: 97.5 F | OXYGEN SATURATION: 98 % | HEART RATE: 97 BPM | DIASTOLIC BLOOD PRESSURE: 67 MMHG

## 2025-04-14 DIAGNOSIS — A04.72 C. DIFFICILE COLITIS: Primary | ICD-10-CM

## 2025-04-14 LAB
ALBUMIN SERPL BCG-MCNC: 4 G/DL (ref 3.5–5)
ALP SERPL-CCNC: 27 U/L (ref 34–104)
ALT SERPL W P-5'-P-CCNC: 13 U/L (ref 7–52)
ANION GAP SERPL CALCULATED.3IONS-SCNC: 10 MMOL/L (ref 4–13)
AST SERPL W P-5'-P-CCNC: 16 U/L (ref 13–39)
BASOPHILS # BLD AUTO: 0.02 THOUSANDS/ÂΜL (ref 0–0.1)
BASOPHILS NFR BLD AUTO: 0 % (ref 0–1)
BILIRUB SERPL-MCNC: 0.32 MG/DL (ref 0.2–1)
BUN SERPL-MCNC: 6 MG/DL (ref 5–25)
CALCIUM SERPL-MCNC: 8.8 MG/DL (ref 8.4–10.2)
CHLORIDE SERPL-SCNC: 108 MMOL/L (ref 96–108)
CO2 SERPL-SCNC: 21 MMOL/L (ref 21–32)
CREAT SERPL-MCNC: 0.58 MG/DL (ref 0.6–1.3)
CRP SERPL QL: 15.9 MG/L
EOSINOPHIL # BLD AUTO: 0.14 THOUSAND/ÂΜL (ref 0–0.61)
EOSINOPHIL NFR BLD AUTO: 3 % (ref 0–6)
ERYTHROCYTE [DISTWIDTH] IN BLOOD BY AUTOMATED COUNT: 14.3 % (ref 11.6–15.1)
ERYTHROCYTE [SEDIMENTATION RATE] IN BLOOD: 10 MM/HOUR (ref 0–19)
EXT PREGNANCY TEST URINE: NEGATIVE
EXT. CONTROL: NORMAL
GFR SERPL CREATININE-BSD FRML MDRD: 130 ML/MIN/1.73SQ M
GLUCOSE SERPL-MCNC: 97 MG/DL (ref 65–140)
HCT VFR BLD AUTO: 35.5 % (ref 34.8–46.1)
HGB BLD-MCNC: 11.4 G/DL (ref 11.5–15.4)
IMM GRANULOCYTES # BLD AUTO: 0.01 THOUSAND/UL (ref 0–0.2)
IMM GRANULOCYTES NFR BLD AUTO: 0 % (ref 0–2)
LACTATE SERPL-SCNC: 0.8 MMOL/L (ref 0.5–2)
LIPASE SERPL-CCNC: 30 U/L (ref 11–82)
LYMPHOCYTES # BLD AUTO: 1.59 THOUSANDS/ÂΜL (ref 0.6–4.47)
LYMPHOCYTES NFR BLD AUTO: 33 % (ref 14–44)
MAGNESIUM SERPL-MCNC: 1.8 MG/DL (ref 1.9–2.7)
MCH RBC QN AUTO: 26.1 PG (ref 26.8–34.3)
MCHC RBC AUTO-ENTMCNC: 32.1 G/DL (ref 31.4–37.4)
MCV RBC AUTO: 81 FL (ref 82–98)
MONOCYTES # BLD AUTO: 0.55 THOUSAND/ÂΜL (ref 0.17–1.22)
MONOCYTES NFR BLD AUTO: 11 % (ref 4–12)
NEUTROPHILS # BLD AUTO: 2.54 THOUSANDS/ÂΜL (ref 1.85–7.62)
NEUTS SEG NFR BLD AUTO: 53 % (ref 43–75)
NRBC BLD AUTO-RTO: 0 /100 WBCS
PLATELET # BLD AUTO: 186 THOUSANDS/UL (ref 149–390)
PMV BLD AUTO: 10.3 FL (ref 8.9–12.7)
POTASSIUM SERPL-SCNC: 3.5 MMOL/L (ref 3.5–5.3)
PROT SERPL-MCNC: 7 G/DL (ref 6.4–8.4)
RBC # BLD AUTO: 4.37 MILLION/UL (ref 3.81–5.12)
SODIUM SERPL-SCNC: 139 MMOL/L (ref 135–147)
WBC # BLD AUTO: 4.85 THOUSAND/UL (ref 4.31–10.16)

## 2025-04-14 PROCEDURE — 83735 ASSAY OF MAGNESIUM: CPT | Performed by: EMERGENCY MEDICINE

## 2025-04-14 PROCEDURE — 99285 EMERGENCY DEPT VISIT HI MDM: CPT | Performed by: EMERGENCY MEDICINE

## 2025-04-14 PROCEDURE — 86140 C-REACTIVE PROTEIN: CPT | Performed by: EMERGENCY MEDICINE

## 2025-04-14 PROCEDURE — 85025 COMPLETE CBC W/AUTO DIFF WBC: CPT | Performed by: EMERGENCY MEDICINE

## 2025-04-14 PROCEDURE — 85652 RBC SED RATE AUTOMATED: CPT | Performed by: EMERGENCY MEDICINE

## 2025-04-14 PROCEDURE — 74177 CT ABD & PELVIS W/CONTRAST: CPT

## 2025-04-14 PROCEDURE — 83690 ASSAY OF LIPASE: CPT | Performed by: EMERGENCY MEDICINE

## 2025-04-14 PROCEDURE — 99285 EMERGENCY DEPT VISIT HI MDM: CPT

## 2025-04-14 PROCEDURE — 80053 COMPREHEN METABOLIC PANEL: CPT | Performed by: EMERGENCY MEDICINE

## 2025-04-14 PROCEDURE — 96365 THER/PROPH/DIAG IV INF INIT: CPT

## 2025-04-14 PROCEDURE — 83605 ASSAY OF LACTIC ACID: CPT | Performed by: EMERGENCY MEDICINE

## 2025-04-14 PROCEDURE — 36415 COLL VENOUS BLD VENIPUNCTURE: CPT | Performed by: EMERGENCY MEDICINE

## 2025-04-14 PROCEDURE — 96366 THER/PROPH/DIAG IV INF ADDON: CPT

## 2025-04-14 PROCEDURE — 81025 URINE PREGNANCY TEST: CPT | Performed by: EMERGENCY MEDICINE

## 2025-04-14 RX ORDER — VANCOMYCIN HYDROCHLORIDE 125 MG/1
125 CAPSULE ORAL 4 TIMES DAILY
Qty: 56 CAPSULE | Refills: 0 | Status: SHIPPED | OUTPATIENT
Start: 2025-04-14 | End: 2025-04-28

## 2025-04-14 RX ORDER — ACETAMINOPHEN 10 MG/ML
1000 INJECTION, SOLUTION INTRAVENOUS ONCE
Status: COMPLETED | OUTPATIENT
Start: 2025-04-14 | End: 2025-04-14

## 2025-04-14 RX ORDER — ONDANSETRON 4 MG/1
4 TABLET, ORALLY DISINTEGRATING ORAL EVERY 6 HOURS PRN
Qty: 9 TABLET | Refills: 0 | Status: SHIPPED | OUTPATIENT
Start: 2025-04-14 | End: 2025-04-29

## 2025-04-14 RX ORDER — MORPHINE SULFATE 4 MG/ML
4 INJECTION, SOLUTION INTRAMUSCULAR; INTRAVENOUS ONCE
Status: DISCONTINUED | OUTPATIENT
Start: 2025-04-14 | End: 2025-04-14

## 2025-04-14 RX ADMIN — SODIUM CHLORIDE 1000 ML: 0.9 INJECTION, SOLUTION INTRAVENOUS at 07:22

## 2025-04-14 RX ADMIN — IOHEXOL 85 ML: 350 INJECTION, SOLUTION INTRAVENOUS at 07:55

## 2025-04-14 RX ADMIN — Medication 125 MG: at 09:21

## 2025-04-14 RX ADMIN — ACETAMINOPHEN 1000 MG: 10 INJECTION INTRAVENOUS at 07:26

## 2025-04-14 NOTE — Clinical Note
Kimmy Whitaker was seen and treated in our emergency department on 4/14/2025.                Diagnosis:     Kimmy  may return to work on return date.    She may return on this date: 04/21/2025         If you have any questions or concerns, please don't hesitate to call.      Yen Snyder, DO    ______________________________           _______________          _______________  Hospital Representative                              Date                                Time

## 2025-04-14 NOTE — ED PROVIDER NOTES
Time reflects when diagnosis was documented in both MDM as applicable and the Disposition within this note       Time User Action Codes Description Comment    4/14/2025  9:11 AM Yen Snyder Add [A04.72] C. difficile colitis           ED Disposition       ED Disposition   Discharge    Condition   Stable    Date/Time   Mon Apr 14, 2025  9:11 AM    Comment   Kimmy Whitaker discharge to home/self care.                   Assessment & Plan       Medical Decision Making  Patient evaluated with labs imaging.  I reviewed the results and discussed them with the patient.  Patient discharged with appropriate instructions medications and follow-up.  Patient verbalized understanding had no further questions at the time of discharge.  Patient had stable vital signs and well-appearing at the time of discharge.  Spoke to GI via secure chat no procedures planned with the patient has active colitis okay with p.o. vancomycin and outpatient follow-up.  I counseled the patient offered her the option of being admitted if she was nervous about the rectal bleeding however she said she would rather go home and I told her if her rectal bleeding got worse she needs to come back to the ED as soon as possible.    Problems Addressed:  C. difficile colitis: acute illness or injury    Amount and/or Complexity of Data Reviewed  External Data Reviewed: notes.  Labs: ordered. Decision-making details documented in ED Course.  Radiology: ordered. Decision-making details documented in ED Course.    Risk  Prescription drug management.             Medications   acetaminophen (Ofirmev) injection 1,000 mg (0 mg Intravenous Stopped 4/14/25 0908)   sodium chloride 0.9 % bolus 1,000 mL (0 mL Intravenous Stopped 4/14/25 0920)   iohexol (OMNIPAQUE) 350 MG/ML injection (MULTI-DOSE) 85 mL (85 mL Intravenous Given 4/14/25 0755)   vancomycin (VANCOCIN) oral solution 125 mg (125 mg Oral Given 4/14/25 0921)       ED Risk Strat Scores                    No data  recorded        SBIRT 22yo+      Flowsheet Row Most Recent Value   Initial Alcohol Screen: US AUDIT-C     1. How often do you have a drink containing alcohol? 0 Filed at: 04/14/2025 0648   2. How many drinks containing alcohol do you have on a typical day you are drinking?  0 Filed at: 04/14/2025 0648   3a. Male UNDER 65: How often do you have five or more drinks on one occasion? 0 Filed at: 04/14/2025 0648   3b. FEMALE Any Age, or MALE 65+: How often do you have 4 or more drinks on one occassion? 0 Filed at: 04/14/2025 0648   Audit-C Score 0 Filed at: 04/14/2025 0648   LAYNE: How many times in the past year have you...    Used an illegal drug or used a prescription medication for non-medical reasons? Never Filed at: 04/14/2025 0648                            History of Present Illness       Chief Complaint   Patient presents with    Rectal Bleeding     Pt reports having blood in stool this morning. Was seen and diagnosed with colitis recently. Started bentyl recently. States also still having intesne cramping and discomfort from previous visit that has not resolved. Pt recently dx with cdiff       Past Medical History:   Diagnosis Date    Asthma     Colitis       History reviewed. No pertinent surgical history.   History reviewed. No pertinent family history.   Social History     Tobacco Use    Smoking status: Former     Current packs/day: 0.25     Types: Cigarettes    Smokeless tobacco: Never   Vaping Use    Vaping status: Every Day    Substances: Nicotine, Flavoring   Substance Use Topics    Alcohol use: Not Currently    Drug use: Not Currently      E-Cigarette/Vaping    E-Cigarette Use Current Every Day User       E-Cigarette/Vaping Substances    Nicotine Yes     THC No     CBD No     Flavoring Yes     Other No     Unknown No       I have reviewed and agree with the history as documented.     24-year-old female presents with diarrhea bloody today with abdominal cramping she was recently seen here few weeks ago  with diarrhea diagnosed with colitis put on Cipro and Flagyl which she has finished that she followed up with GI has some studies scheduled outpatient C. difficile testing recently came back positive.  She denies any recent sick contacts travel history no recent antibiotic use before the symptoms of diarrhea started.  No fevers chills.      History provided by:  Patient   used: No        Review of Systems   Constitutional: Negative.    HENT: Negative.     Eyes: Negative.    Respiratory: Negative.     Cardiovascular: Negative.    Gastrointestinal:  Positive for abdominal pain and blood in stool.   Endocrine: Negative.    Genitourinary: Negative.    Musculoskeletal: Negative.    Skin: Negative.    Allergic/Immunologic: Negative.    Neurological: Negative.    Hematological: Negative.    Psychiatric/Behavioral: Negative.     All other systems reviewed and are negative.          Objective       ED Triage Vitals [04/14/25 0646]   Temperature Pulse Blood Pressure Respirations SpO2 Patient Position - Orthostatic VS   97.5 °F (36.4 °C) 97 125/67 18 98 % Lying      Temp Source Heart Rate Source BP Location FiO2 (%) Pain Score    Oral Monitor Left arm -- 6      Vitals      Date and Time Temp Pulse SpO2 Resp BP Pain Score FACES Pain Rating User   04/14/25 0646 97.5 °F (36.4 °C) 97 98 % 18 125/67 6 -- [place]            Physical Exam  Vitals and nursing note reviewed.   Constitutional:       Appearance: Normal appearance.   HENT:      Head: Normocephalic and atraumatic.      Nose: Nose normal.      Mouth/Throat:      Mouth: Mucous membranes are moist.   Eyes:      Extraocular Movements: Extraocular movements intact.      Pupils: Pupils are equal, round, and reactive to light.   Cardiovascular:      Rate and Rhythm: Normal rate and regular rhythm.   Pulmonary:      Effort: Pulmonary effort is normal.      Breath sounds: Normal breath sounds.   Abdominal:      General: Abdomen is flat. Bowel sounds are normal.       Palpations: Abdomen is soft.      Tenderness: There is abdominal tenderness.   Musculoskeletal:         General: Normal range of motion.      Cervical back: Normal range of motion and neck supple.   Skin:     General: Skin is warm.      Capillary Refill: Capillary refill takes less than 2 seconds.   Neurological:      General: No focal deficit present.      Mental Status: She is alert and oriented to person, place, and time. Mental status is at baseline.   Psychiatric:         Mood and Affect: Mood normal.         Thought Content: Thought content normal.         Results Reviewed       Procedure Component Value Units Date/Time    Comprehensive metabolic panel [[id number]]  (Abnormal) Collected: 04/14/25 0719    Lab Status: Final result Specimen: Blood from Arm, Left Updated: 04/14/25 0753     Sodium 139 mmol/L      Potassium 3.5 mmol/L      Chloride 108 mmol/L      CO2 21 mmol/L      ANION GAP 10 mmol/L      BUN 6 mg/dL      Creatinine 0.58 mg/dL      Glucose 97 mg/dL      Calcium 8.8 mg/dL      AST 16 U/L      ALT 13 U/L      Alkaline Phosphatase 27 U/L      Total Protein 7.0 g/dL      Albumin 4.0 g/dL      Total Bilirubin 0.32 mg/dL      eGFR 130 ml/min/1.73sq m     Narrative:      National Kid[employer] guidelines for Chronic Kidney Disease (CKD):     Stage 1 with normal or high GFR (GFR > 90 mL/min/1.73 square meters)    Stage 2 Mild CKD (GFR = 60-89 mL/min/1.73 square meters)    Stage 3A Moderate CKD (GFR = 45-59 mL/min/1.73 square meters)    Stage 3B Moderate CKD (GFR = 30-44 mL/min/1.73 square meters)    Stage 4 Severe CKD (GFR = 15-29 mL/min/1.73 square meters)    Stage 5 End Stage CKD (GFR <15 mL/min/1.73 square meters)  Note: GFR calculation is accurate only with a steady state creatinine    Magnesium [[id number]]  (Abnormal) Collected: 04/14/25 0719    Lab Status: Final result Specimen: Blood from Arm, Left Updated: 04/14/25 0753     Magnesium 1.8 mg/dL     Lipase [[id number]]  (Normal) Collected:  04/14/25 0719    Lab Status: Final result Specimen: Blood from Arm, Left Updated: 04/14/25 0753     Lipase 30 u/L     Lactic acid, plasma (w/reflex if result > 2.0) [[id number]]  (Normal) Collected: 04/14/25 0723    Lab Status: Final result Specimen: Blood from Arm, Left Updated: 04/14/25 0753     LACTIC ACID 0.8 mmol/L     Narrative:      Result may be elevated if tourniquet was used during collection.    C-reactive protein [[id number]]  (Abnormal) Collected: 04/14/25 0723    Lab Status: Final result Specimen: Blood from Arm, Left Updated: 04/14/25 0752     CRP 15.9 mg/L     Sedimentation rate, automated [[id number]]  (Normal) Collected: 04/14/25 0723    Lab Status: Final result Specimen: Blood from Arm, Left Updated: 04/14/25 0736     Sed Rate 10 mm/hour     CBC and differential [[id number]]  (Abnormal) Collected: 04/14/25 0719    Lab Status: Final result Specimen: Blood from Arm, Left Updated: 04/14/25 0732     WBC 4.85 Thousand/uL      RBC 4.37 Million/uL      Hemoglobin 11.4 g/dL      Hematocrit 35.5 %      MCV 81 fL      MCH 26.1 pg      MCHC 32.1 g/dL      RDW 14.3 %      MPV 10.3 fL      Platelets 186 Thousands/uL      nRBC 0 /100 WBCs      Segmented % 53 %      Immature Grans % 0 %      Lymphocytes % 33 %      Monocytes % 11 %      Eosinophils Relative 3 %      Basophils Relative 0 %      Absolute Neutrophils 2.54 Thousands/µL      Absolute Immature Grans 0.01 Thousand/uL      Absolute Lymphocytes 1.59 Thousands/µL      Absolute Monocytes 0.55 Thousand/µL      Eosinophils Absolute 0.14 Thousand/µL      Basophils Absolute 0.02 Thousands/µL     POCT pregnancy, urine [[id number]]  (Normal) Collected: 04/14/25 0730    Lab Status: Final result Updated: 04/14/25 0730     EXT Preg Test, Ur Negative     Control Valid            CT abdomen pelvis with contrast   Final Interpretation by Ho Monk MD (04/14 0810)      Persistent mild wall thickening of the descending colon and sigmoid colon suspicious for a  segmental colitis.      Trace free fluid in the pelvis, slightly improved.         Workstation performed: [id number]             Procedures    ED Medication and Procedure Management   Prior to Admission Medications   Prescriptions Last Dose Informant Patient Reported? Taking?   albuterol (PROVENTIL HFA,VENTOLIN HFA) 90 mcg/act inhaler  Self Yes No   Sig: Inhale 2 puffs every 4 (four) hours as needed   colesevelam (WELCHOL) 625 mg tablet   No No   Sig: Take 2 tablets (1,250 mg total) by mouth 2 (two) times a day with meals   dicyclomine (BENTYL) 20 mg tablet   No No   Sig: Take 1 tablet (20 mg total) by mouth 2 (two) times a day as needed (abdominal cramping)   famotidine (PEPCID) 20 mg tablet  Self No No   Sig: Take 1 tablet (20 mg total) by mouth 2 (two) times a day   Patient not taking: Reported on 4/11/2025   meclizine (ANTIVERT) 25 mg tablet  Self No No   Sig: Take 1 tablet (25 mg total) by mouth every 8 (eight) hours as needed for dizziness   norethindrone-ethinyl estradiol (JUNEL FE 1/20) 1-20 MG-MCG per tablet  Self Yes No   Sig: Take 1 tablet by mouth daily   polyethylene glycol (Golytely) 4000 mL solution   No No   Sig: Take 4,000 mL by mouth once for 1 dose Take 4000 mL by mouth once for 1 dose. Use as directed      Facility-Administered Medications: None     Discharge Medication List as of 4/14/2025  9:13 AM        START taking these medications    Details   ondansetron (ZOFRAN-ODT) 4 mg disintegrating tablet Take 1 tablet (4 mg total) by mouth every 6 (six) hours as needed for nausea for up to 3 days, Starting Mon 4/14/2025, Until Thu 4/17/2025 at 2359, Normal      vancomycin (VANCOCIN) 125 MG capsule Take 1 capsule (125 mg total) by mouth 4 (four) times a day for 14 days, Starting Mon 4/14/2025, Until Mon 4/28/2025, Normal           CONTINUE these medications which have NOT CHANGED    Details   albuterol (PROVENTIL HFA,VENTOLIN HFA) 90 mcg/act inhaler Inhale 2 puffs every 4 (four) hours as needed,  Historical Med      colesevelam (WELCHOL) 625 mg tablet Take 2 tablets (1,250 mg total) by mouth 2 (two) times a day with meals, Starting Fri 4/11/2025, Normal      dicyclomine (BENTYL) 20 mg tablet Take 1 tablet (20 mg total) by mouth 2 (two) times a day as needed (abdominal cramping), Starting Fri 4/11/2025, Normal      famotidine (PEPCID) 20 mg tablet Take 1 tablet (20 mg total) by mouth 2 (two) times a day, Starting Fri 3/28/2025, Normal      meclizine (ANTIVERT) 25 mg tablet Take 1 tablet (25 mg total) by mouth every 8 (eight) hours as needed for dizziness, Starting Mon 12/9/2024, Normal      norethindrone-ethinyl estradiol (JUNEL FE 1/20) 1-20 MG-MCG per tablet Take 1 tablet by mouth daily, Historical Med      polyethylene glycol (Golytely) 4000 mL solution Take 4,000 mL by mouth once for 1 dose Take 4000 mL by mouth once for 1 dose. Use as directed, Starting Fri 4/11/2025, Normal           No discharge procedures on file.  ED SEPSIS DOCUMENTATION   Time reflects when diagnosis was documented in both MDM as applicable and the Disposition within this note       Time User Action Codes Description Comment    4/14/2025  9:11 AM Yen Snyder [A04.72] C. difficile colitis                  Yen Snyder DO  04/14/25 1509

## 2025-04-14 NOTE — DISCHARGE INSTRUCTIONS
Please return for worsening abdominal pain bloody stools vomiting to the ED, the next 12 to 24 hours.  Please take the antibiotics as prescribed follow-up with GI as scheduled please call their clinic and see if they want to reschedule you.    Please do contact precautions as much as possible and do not go on your immunocompromised patient such as patients with cancer or HIV pregnant patients

## 2025-04-14 NOTE — Clinical Note
accompanied Kimmy Whitaker to the emergency department on 4/14/2025.    Return date if applicable: 04/15/2025        If you have any questions or concerns, please don't hesitate to call.      Yen Snyder, DO

## 2025-04-15 ENCOUNTER — RESULTS FOLLOW-UP (OUTPATIENT)
Age: 25
End: 2025-04-15

## 2025-04-16 ENCOUNTER — TELEPHONE (OUTPATIENT)
Age: 25
End: 2025-04-16

## 2025-04-16 NOTE — TELEPHONE ENCOUNTER
Pt calling stating Sara sent prescriptions into pharmacy and she wants to know what they are for. I informed her that Golytely is the prep medication for her colonoscopy. The Bentyl is for cramping. Pt wanting to know if colesevelam is also prep? I called gi nurse Magaly and she said this medication is for diarrhea. I informed pt and she understood.

## 2025-04-16 NOTE — TELEPHONE ENCOUNTER
SPOKE WITH [facility], CALLING TO REPORT INTERACTION WITH WELCHOL AND PT BIRTH CONTROL, MAY DECREASE EFFICACY. INFORMED PHARMACIST PROVIDER IS AWARE AND HAS COUNSELED PT TO TAKE 4 HOURS PRIOR. NO FURTHER ACTION NEEDED.

## 2025-04-29 ENCOUNTER — OFFICE VISIT (OUTPATIENT)
Age: 25
End: 2025-04-29

## 2025-04-29 VITALS
DIASTOLIC BLOOD PRESSURE: 68 MMHG | SYSTOLIC BLOOD PRESSURE: 113 MMHG | BODY MASS INDEX: 19.6 KG/M2 | HEIGHT: 64 IN | WEIGHT: 114.8 LBS | HEART RATE: 97 BPM

## 2025-04-29 DIAGNOSIS — R93.3 ABNORMAL CT SCAN, COLON: ICD-10-CM

## 2025-04-29 DIAGNOSIS — A04.72 C. DIFFICILE DIARRHEA: Primary | ICD-10-CM

## 2025-04-29 PROCEDURE — 99214 OFFICE O/P EST MOD 30 MIN: CPT | Performed by: NURSE PRACTITIONER

## 2025-04-29 NOTE — H&P (VIEW-ONLY)
Name: Kimmy Whitaker      : 2000      MRN: [id number]  Encounter Provider: CHICHI Alford  Encounter Date: 2025   Encounter department: [facility]  :  Assessment & Plan  C. difficile diarrhea  This is a 24-year-old female with history of tobacco use and prior diagnosis of IBS with recent onset of abdominal cramping/diarrhea with segmental colitis involving the descending, sigmoid, and rectum on CT scan 3/28 treated with a 10-day course of Cipro/Flagyl.  She reported some initial improvement in her symptoms following a course of antibiotics, however then it acutely worsened.  Stool studies were performed which came back positive for C. difficile PCR/toxin. She is now s /p 10-day course of vancomycin with symptom resolution.    -Discontinue WelChol  -Recommend judicious use of antibiotics  -Recommend daily probiotic/yogurt  -Follow-up for colonoscopy as scheduled on May 30th   -Obtain celiac panel as previously ordered, if positive will add EGD as well       Abnormal CT scan, colon  See above           History of Present Illness   Kimmy Whitaker is a 24 y.o. female who presents for follow-up after C. difficile treatment.  She was originally seen in the ED 3/28 due to sudden onset abdominal cramping and diarrhea with segmental colitis involving the descending colon, sigmoid colon and rectum seen on CT scan and was given a 10-day course of Cipro/Flagyl.  She had reported the antibiotics originally improved her symptoms, but then she had acute worsening with recurrent abdominal cramping and frequent liquid stools.  She had stool studies including enteric panel, O&P, C. difficile, and Giardia which came back positive for C. difficile PCR/toxin so was given a 10-day course of vancomycin.  She completed the course yesterday and reports she is having formed stool every other day.  She no longer is taking the Bentyl but has been taking the WelChol.  She has a  "colonoscopy scheduled on 5/30.      HPI    Review of Systems A complete review of systems is negative other than that noted above in the HPI.      Current Outpatient Medications   Medication Sig Dispense Refill    albuterol (PROVENTIL HFA,VENTOLIN HFA) 90 mcg/act inhaler Inhale 2 puffs every 4 (four) hours as needed      meclizine (ANTIVERT) 25 mg tablet Take 1 tablet (25 mg total) by mouth every 8 (eight) hours as needed for dizziness 30 tablet 0    norethindrone-ethinyl estradiol (JUNEL FE 1/20) 1-20 MG-MCG per tablet Take 1 tablet by mouth daily      ondansetron (ZOFRAN-ODT) 4 mg disintegrating tablet Take 1 tablet (4 mg total) by mouth every 6 (six) hours as needed for nausea for up to 3 days 9 tablet 0    famotidine (PEPCID) 20 mg tablet Take 1 tablet (20 mg total) by mouth 2 (two) times a day (Patient not taking: Reported on 4/11/2025) 30 tablet 0    polyethylene glycol (Golytely) 4000 mL solution Take 4,000 mL by mouth once for 1 dose Take 4000 mL by mouth once for 1 dose. Use as directed 4000 mL 0     No current facility-administered medications for this visit.     Objective   /68 (BP Location: Left arm, Patient Position: Sitting, Cuff Size: Standard)   Pulse 97   Ht 5' 4\" (1.626 m)   Wt 52.1 kg (114 lb 12.8 oz)   BMI 19.71 kg/m²     Physical Exam  Vitals and nursing note reviewed.   Constitutional:       General: She is not in acute distress.     Appearance: She is well-developed.   HENT:      Head: Normocephalic and atraumatic.   Eyes:      Conjunctiva/sclera: Conjunctivae normal.   Cardiovascular:      Rate and Rhythm: Normal rate and regular rhythm.      Heart sounds: No murmur heard.  Pulmonary:      Effort: Pulmonary effort is normal. No respiratory distress.      Breath sounds: Normal breath sounds.   Abdominal:      Palpations: Abdomen is soft.      Tenderness: There is no abdominal tenderness.   Musculoskeletal:         General: No swelling.      Cervical back: Neck supple.   Skin:     " General: Skin is warm and dry.      Capillary Refill: Capillary refill takes less than 2 seconds.   Neurological:      Mental Status: She is alert and oriented to person, place, and time.   Psychiatric:         Mood and Affect: Mood normal.            Lab Results: I personally reviewed relevant lab results.

## 2025-04-29 NOTE — PROGRESS NOTES
Name: Kimmy Whitaker      : 2000      MRN: [id number]  Encounter Provider: CHICHI Alford  Encounter Date: 2025   Encounter department: [facility]  :  Assessment & Plan  C. difficile diarrhea  This is a 24-year-old female with history of tobacco use and prior diagnosis of IBS with recent onset of abdominal cramping/diarrhea with segmental colitis involving the descending, sigmoid, and rectum on CT scan 3/28 treated with a 10-day course of Cipro/Flagyl.  She reported some initial improvement in her symptoms following a course of antibiotics, however then it acutely worsened.  Stool studies were performed which came back positive for C. difficile PCR/toxin. She is now s /p 10-day course of vancomycin with symptom resolution.    -Discontinue WelChol  -Recommend judicious use of antibiotics  -Recommend daily probiotic/yogurt  -Follow-up for colonoscopy as scheduled on May 30th   -Obtain celiac panel as previously ordered, if positive will add EGD as well       Abnormal CT scan, colon  See above           History of Present Illness   Kimmy Whitaker is a 24 y.o. female who presents for follow-up after C. difficile treatment.  She was originally seen in the ED 3/28 due to sudden onset abdominal cramping and diarrhea with segmental colitis involving the descending colon, sigmoid colon and rectum seen on CT scan and was given a 10-day course of Cipro/Flagyl.  She had reported the antibiotics originally improved her symptoms, but then she had acute worsening with recurrent abdominal cramping and frequent liquid stools.  She had stool studies including enteric panel, O&P, C. difficile, and Giardia which came back positive for C. difficile PCR/toxin so was given a 10-day course of vancomycin.  She completed the course yesterday and reports she is having formed stool every other day.  She no longer is taking the Bentyl but has been taking the WelChol.  She has a  "colonoscopy scheduled on 5/30.      HPI    Review of Systems A complete review of systems is negative other than that noted above in the HPI.      Current Outpatient Medications   Medication Sig Dispense Refill    albuterol (PROVENTIL HFA,VENTOLIN HFA) 90 mcg/act inhaler Inhale 2 puffs every 4 (four) hours as needed      meclizine (ANTIVERT) 25 mg tablet Take 1 tablet (25 mg total) by mouth every 8 (eight) hours as needed for dizziness 30 tablet 0    norethindrone-ethinyl estradiol (JUNEL FE 1/20) 1-20 MG-MCG per tablet Take 1 tablet by mouth daily      ondansetron (ZOFRAN-ODT) 4 mg disintegrating tablet Take 1 tablet (4 mg total) by mouth every 6 (six) hours as needed for nausea for up to 3 days 9 tablet 0    famotidine (PEPCID) 20 mg tablet Take 1 tablet (20 mg total) by mouth 2 (two) times a day (Patient not taking: Reported on 4/11/2025) 30 tablet 0    polyethylene glycol (Golytely) 4000 mL solution Take 4,000 mL by mouth once for 1 dose Take 4000 mL by mouth once for 1 dose. Use as directed 4000 mL 0     No current facility-administered medications for this visit.     Objective   /68 (BP Location: Left arm, Patient Position: Sitting, Cuff Size: Standard)   Pulse 97   Ht 5' 4\" (1.626 m)   Wt 52.1 kg (114 lb 12.8 oz)   BMI 19.71 kg/m²     Physical Exam  Vitals and nursing note reviewed.   Constitutional:       General: She is not in acute distress.     Appearance: She is well-developed.   HENT:      Head: Normocephalic and atraumatic.   Eyes:      Conjunctiva/sclera: Conjunctivae normal.   Cardiovascular:      Rate and Rhythm: Normal rate and regular rhythm.      Heart sounds: No murmur heard.  Pulmonary:      Effort: Pulmonary effort is normal. No respiratory distress.      Breath sounds: Normal breath sounds.   Abdominal:      Palpations: Abdomen is soft.      Tenderness: There is no abdominal tenderness.   Musculoskeletal:         General: No swelling.      Cervical back: Neck supple.   Skin:     " General: Skin is warm and dry.      Capillary Refill: Capillary refill takes less than 2 seconds.   Neurological:      Mental Status: She is alert and oriented to person, place, and time.   Psychiatric:         Mood and Affect: Mood normal.            Lab Results: I personally reviewed relevant lab results.

## 2025-05-06 ENCOUNTER — TELEPHONE (OUTPATIENT)
Age: 25
End: 2025-05-06

## 2025-05-06 DIAGNOSIS — R19.7 DIARRHEA, UNSPECIFIED TYPE: Primary | ICD-10-CM

## 2025-05-06 NOTE — TELEPHONE ENCOUNTER
Pt. Calling with concerns of returned diarrhea, moving bowels 3-5 times a day, having mushy mud like consistency, weakness and abdominal cramping unrelieved with bentyl, finished C. Diff treatment last week, due to for colonoscopy on 5/12/25, pt. Wants to keep colonoscopy and is asking for recommendations for continued symptoms post C. Diff treatment, please advise

## 2025-05-06 NOTE — TELEPHONE ENCOUNTER
Scheduled date of colonoscopy (as of today): 5/12/25    Physician performing colonoscopy: Dr. Yeager    Location of colonoscopy: [facility]

## 2025-05-07 ENCOUNTER — OFFICE VISIT (OUTPATIENT)
Dept: FAMILY MEDICINE CLINIC | Facility: CLINIC | Age: 25
End: 2025-05-07
Payer: COMMERCIAL

## 2025-05-07 VITALS
TEMPERATURE: 98.4 F | SYSTOLIC BLOOD PRESSURE: 110 MMHG | OXYGEN SATURATION: 98 % | WEIGHT: 113 LBS | DIASTOLIC BLOOD PRESSURE: 70 MMHG | HEIGHT: 66 IN | HEART RATE: 100 BPM | RESPIRATION RATE: 14 BRPM | BODY MASS INDEX: 18.16 KG/M2

## 2025-05-07 DIAGNOSIS — J45.20 MILD INTERMITTENT ASTHMA WITHOUT COMPLICATION: ICD-10-CM

## 2025-05-07 DIAGNOSIS — R10.9 ABDOMINAL CRAMPING: Primary | ICD-10-CM

## 2025-05-07 DIAGNOSIS — R19.7 DIARRHEA, UNSPECIFIED TYPE: ICD-10-CM

## 2025-05-07 PROCEDURE — 99203 OFFICE O/P NEW LOW 30 MIN: CPT | Performed by: FAMILY MEDICINE

## 2025-05-07 RX ORDER — ALBUTEROL SULFATE 90 UG/1
2 INHALANT RESPIRATORY (INHALATION) EVERY 4 HOURS PRN
Qty: 6.7 G | Refills: 0 | Status: SHIPPED | OUTPATIENT
Start: 2025-05-07

## 2025-05-07 NOTE — PROGRESS NOTES
"Name: Kimmy Whitaker      : 2000      MRN: [id number]  Encounter Provider: Dodie Suarez MD  Encounter Date: 2025   Encounter department: [facility]    Assessment & Plan  Abdominal cramping  Likely due to C diff colitis episode  F/u with GI        Mild intermittent asthma without complication    Orders:  •  albuterol (PROVENTIL HFA,VENTOLIN HFA) 90 mcg/act inhaler; Inhale 2 puffs every 4 (four) hours as needed for shortness of breath or wheezing    Diarrhea, unspecified type              History of Present Illness     New pt -  seeing for abd pain and diarrhea -  recent episode of C Diff colitis -  under GI care -  was Tx for 10 days -  did not have repeat test done -  diarrhea came back after pt stopped questran -  restarted after discussion with GI  -  remote h/o Asthma -  did not use rescue inhaler for 5 y  -  wants to have Rx \"just in case\"       Review of Systems   Constitutional: Negative.    HENT: Negative.     Respiratory: Negative.  Negative for chest tightness, shortness of breath and wheezing.    Cardiovascular: Negative.    Gastrointestinal:  Positive for abdominal pain, diarrhea and vomiting. Negative for nausea.   Genitourinary: Negative.    Musculoskeletal: Negative.    Skin: Negative.    Neurological: Negative.      Past Medical History:   Diagnosis Date   • Asthma    • Colitis      History reviewed. No pertinent surgical history.  Family History   Problem Relation Age of Onset   • No Known Problems Mother    • No Known Problems Father      Social History     Tobacco Use   • Smoking status: Former     Current packs/day: 0.25     Types: Cigarettes   • Smokeless tobacco: Current   • Tobacco comments:     Vaping    Vaping Use   • Vaping status: Every Day   • Substances: Nicotine, Flavoring   Substance and Sexual Activity   • Alcohol use: Not Currently   • Drug use: Yes     Types: Marijuana   • Sexual activity: Yes     Partners: Male     Birth control/protection: " "Condom Male, OCP     Current Outpatient Medications on File Prior to Visit   Medication Sig   • colesevelam (WELCHOL) 625 mg tablet Take 1,875 mg by mouth 2 (two) times a day with meals   • dicyclomine (BENTYL) 20 mg tablet Take 20 mg by mouth 2 (two) times a day   • norethindrone-ethinyl estradiol (JUNEL FE 1/20) 1-20 MG-MCG per tablet Take 1 tablet by mouth daily   • [DISCONTINUED] albuterol (PROVENTIL HFA,VENTOLIN HFA) 90 mcg/act inhaler Inhale 2 puffs every 4 (four) hours as needed   • famotidine (PEPCID) 20 mg tablet Take 1 tablet (20 mg total) by mouth 2 (two) times a day (Patient not taking: Reported on 4/11/2025)   • meclizine (ANTIVERT) 25 mg tablet Take 1 tablet (25 mg total) by mouth every 8 (eight) hours as needed for dizziness (Patient not taking: Reported on 5/7/2025)   • ondansetron (ZOFRAN-ODT) 4 mg disintegrating tablet Take 1 tablet (4 mg total) by mouth every 6 (six) hours as needed for nausea for up to 3 days   • polyethylene glycol (Golytely) 4000 mL solution Take 4,000 mL by mouth once for 1 dose Take 4000 mL by mouth once for 1 dose. Use as directed     Allergies   Allergen Reactions   • Blueberry Flavor [Flavoring Agent] Hives   • Penicillins Hives   • Pistachio Nut Extract Skin Test - Food Allergy Hives   • Vaccinium Angustifolium Hives     swelling   swelling    swelling       There is no immunization history on file for this patient.  Objective   /70 (BP Location: Left arm, Patient Position: Sitting, Cuff Size: Standard)   Pulse 100   Temp 98.4 °F (36.9 °C) (Temporal)   Resp 14   Ht 5' 6\" (1.676 m)   Wt 51.3 kg (113 lb)   SpO2 98%   BMI 18.24 kg/m²     Physical Exam  Constitutional:       General: She is not in acute distress.     Appearance: She is not ill-appearing.   Cardiovascular:      Rate and Rhythm: Normal rate and regular rhythm.      Heart sounds: No murmur heard.  Pulmonary:      Effort: Pulmonary effort is normal. No respiratory distress.      Breath sounds: No " wheezing, rhonchi or rales.   Abdominal:      Palpations: Abdomen is soft.      Tenderness: There is generalized abdominal tenderness. There is no guarding or rebound.   Neurological:      Mental Status: She is alert.

## 2025-05-07 NOTE — ASSESSMENT & PLAN NOTE
Orders:  •  albuterol (PROVENTIL HFA,VENTOLIN HFA) 90 mcg/act inhaler; Inhale 2 puffs every 4 (four) hours as needed for shortness of breath or wheezing

## 2025-05-09 ENCOUNTER — APPOINTMENT (OUTPATIENT)
Dept: LAB | Facility: CLINIC | Age: 25
End: 2025-05-09
Payer: COMMERCIAL

## 2025-05-09 ENCOUNTER — APPOINTMENT (OUTPATIENT)
Dept: LAB | Facility: CLINIC | Age: 25
End: 2025-05-09
Attending: NURSE PRACTITIONER
Payer: COMMERCIAL

## 2025-05-09 DIAGNOSIS — R19.7 DIARRHEA, UNSPECIFIED TYPE: ICD-10-CM

## 2025-05-09 DIAGNOSIS — R93.3 ABNORMAL CT SCAN, COLON: ICD-10-CM

## 2025-05-09 LAB
ALBUMIN SERPL BCG-MCNC: 4.6 G/DL (ref 3.5–5)
ALP SERPL-CCNC: 36 U/L (ref 34–104)
ALT SERPL W P-5'-P-CCNC: 24 U/L (ref 7–52)
ANION GAP SERPL CALCULATED.3IONS-SCNC: 9 MMOL/L (ref 4–13)
AST SERPL W P-5'-P-CCNC: 21 U/L (ref 13–39)
BASOPHILS # BLD AUTO: 0.03 THOUSANDS/ÂΜL (ref 0–0.1)
BASOPHILS NFR BLD AUTO: 1 % (ref 0–1)
BILIRUB SERPL-MCNC: 0.5 MG/DL (ref 0.2–1)
BUN SERPL-MCNC: 10 MG/DL (ref 5–25)
CALCIUM SERPL-MCNC: 9.4 MG/DL (ref 8.4–10.2)
CHLORIDE SERPL-SCNC: 107 MMOL/L (ref 96–108)
CO2 SERPL-SCNC: 23 MMOL/L (ref 21–32)
CREAT SERPL-MCNC: 0.61 MG/DL (ref 0.6–1.3)
CRP SERPL QL: 6.9 MG/L
EOSINOPHIL # BLD AUTO: 0.12 THOUSAND/ÂΜL (ref 0–0.61)
EOSINOPHIL NFR BLD AUTO: 2 % (ref 0–6)
ERYTHROCYTE [DISTWIDTH] IN BLOOD BY AUTOMATED COUNT: 14.8 % (ref 11.6–15.1)
GFR SERPL CREATININE-BSD FRML MDRD: 128 ML/MIN/1.73SQ M
GLUCOSE P FAST SERPL-MCNC: 87 MG/DL (ref 65–99)
HCT VFR BLD AUTO: 37.8 % (ref 34.8–46.1)
HGB BLD-MCNC: 12.1 G/DL (ref 11.5–15.4)
IGA SERPL-MCNC: 119 MG/DL (ref 66–433)
IMM GRANULOCYTES # BLD AUTO: 0.01 THOUSAND/UL (ref 0–0.2)
IMM GRANULOCYTES NFR BLD AUTO: 0 % (ref 0–2)
LYMPHOCYTES # BLD AUTO: 1.61 THOUSANDS/ÂΜL (ref 0.6–4.47)
LYMPHOCYTES NFR BLD AUTO: 30 % (ref 14–44)
MCH RBC QN AUTO: 26.5 PG (ref 26.8–34.3)
MCHC RBC AUTO-ENTMCNC: 32 G/DL (ref 31.4–37.4)
MCV RBC AUTO: 83 FL (ref 82–98)
MONOCYTES # BLD AUTO: 0.4 THOUSAND/ÂΜL (ref 0.17–1.22)
MONOCYTES NFR BLD AUTO: 8 % (ref 4–12)
NEUTROPHILS # BLD AUTO: 3.12 THOUSANDS/ÂΜL (ref 1.85–7.62)
NEUTS SEG NFR BLD AUTO: 59 % (ref 43–75)
NRBC BLD AUTO-RTO: 0 /100 WBCS
PLATELET # BLD AUTO: 199 THOUSANDS/UL (ref 149–390)
PMV BLD AUTO: 11.2 FL (ref 8.9–12.7)
POTASSIUM SERPL-SCNC: 4.1 MMOL/L (ref 3.5–5.3)
PROT SERPL-MCNC: 7.3 G/DL (ref 6.4–8.4)
RBC # BLD AUTO: 4.56 MILLION/UL (ref 3.81–5.12)
SODIUM SERPL-SCNC: 139 MMOL/L (ref 135–147)
TSH SERPL DL<=0.05 MIU/L-ACNC: 4.19 UIU/ML (ref 0.45–4.5)
WBC # BLD AUTO: 5.29 THOUSAND/UL (ref 4.31–10.16)

## 2025-05-09 PROCEDURE — 84443 ASSAY THYROID STIM HORMONE: CPT

## 2025-05-09 PROCEDURE — 85025 COMPLETE CBC W/AUTO DIFF WBC: CPT

## 2025-05-09 PROCEDURE — 86364 TISS TRNSGLTMNASE EA IG CLAS: CPT

## 2025-05-09 PROCEDURE — 82784 ASSAY IGA/IGD/IGG/IGM EACH: CPT

## 2025-05-09 PROCEDURE — 36415 COLL VENOUS BLD VENIPUNCTURE: CPT

## 2025-05-09 PROCEDURE — 87493 C DIFF AMPLIFIED PROBE: CPT

## 2025-05-09 PROCEDURE — 86140 C-REACTIVE PROTEIN: CPT

## 2025-05-09 PROCEDURE — 80053 COMPREHEN METABOLIC PANEL: CPT

## 2025-05-10 ENCOUNTER — HOSPITAL ENCOUNTER (EMERGENCY)
Facility: HOSPITAL | Age: 25
Discharge: HOME | End: 2025-05-10
Attending: EMERGENCY MEDICINE | Admitting: EMERGENCY MEDICINE
Payer: COMMERCIAL

## 2025-05-10 VITALS
TEMPERATURE: 98.1 F | RESPIRATION RATE: 20 BRPM | OXYGEN SATURATION: 100 % | BODY MASS INDEX: 18.24 KG/M2 | HEART RATE: 76 BPM | DIASTOLIC BLOOD PRESSURE: 55 MMHG | WEIGHT: 113 LBS | SYSTOLIC BLOOD PRESSURE: 107 MMHG

## 2025-05-10 DIAGNOSIS — K52.9 COLITIS: Primary | ICD-10-CM

## 2025-05-10 DIAGNOSIS — A04.72 C. DIFFICILE COLITIS: ICD-10-CM

## 2025-05-10 DIAGNOSIS — R11.0 NAUSEA: ICD-10-CM

## 2025-05-10 LAB
ALBUMIN SERPL BCG-MCNC: 4.4 G/DL (ref 3.5–5)
ALP SERPL-CCNC: 32 U/L (ref 34–104)
ALT SERPL W P-5'-P-CCNC: 33 U/L (ref 7–52)
ANION GAP SERPL CALCULATED.3IONS-SCNC: 10 MMOL/L (ref 4–13)
AST SERPL W P-5'-P-CCNC: 24 U/L (ref 13–39)
BASOPHILS # BLD AUTO: 0.03 THOUSANDS/ÂΜL (ref 0–0.1)
BASOPHILS NFR BLD AUTO: 0 % (ref 0–1)
BILIRUB SERPL-MCNC: 0.41 MG/DL (ref 0.2–1)
BUN SERPL-MCNC: 9 MG/DL (ref 5–25)
C DIFF TOX A+B STL QL IA: POSITIVE
C DIFF TOX GENS STL QL NAA+PROBE: POSITIVE
CALCIUM SERPL-MCNC: 9 MG/DL (ref 8.4–10.2)
CHLORIDE SERPL-SCNC: 108 MMOL/L (ref 96–108)
CO2 SERPL-SCNC: 20 MMOL/L (ref 21–32)
CREAT SERPL-MCNC: 0.6 MG/DL (ref 0.6–1.3)
EOSINOPHIL # BLD AUTO: 0.08 THOUSAND/ÂΜL (ref 0–0.61)
EOSINOPHIL NFR BLD AUTO: 1 % (ref 0–6)
ERYTHROCYTE [DISTWIDTH] IN BLOOD BY AUTOMATED COUNT: 14.6 % (ref 11.6–15.1)
GFR SERPL CREATININE-BSD FRML MDRD: 128 ML/MIN/1.73SQ M
GLUCOSE SERPL-MCNC: 91 MG/DL (ref 65–140)
HCT VFR BLD AUTO: 37.8 % (ref 34.8–46.1)
HGB BLD-MCNC: 12.2 G/DL (ref 11.5–15.4)
IMM GRANULOCYTES # BLD AUTO: 0.02 THOUSAND/UL (ref 0–0.2)
IMM GRANULOCYTES NFR BLD AUTO: 0 % (ref 0–2)
LYMPHOCYTES # BLD AUTO: 1.5 THOUSANDS/ÂΜL (ref 0.6–4.47)
LYMPHOCYTES NFR BLD AUTO: 23 % (ref 14–44)
MCH RBC QN AUTO: 26.8 PG (ref 26.8–34.3)
MCHC RBC AUTO-ENTMCNC: 32.3 G/DL (ref 31.4–37.4)
MCV RBC AUTO: 83 FL (ref 82–98)
MONOCYTES # BLD AUTO: 0.59 THOUSAND/ÂΜL (ref 0.17–1.22)
MONOCYTES NFR BLD AUTO: 9 % (ref 4–12)
NEUTROPHILS # BLD AUTO: 4.46 THOUSANDS/ÂΜL (ref 1.85–7.62)
NEUTS SEG NFR BLD AUTO: 67 % (ref 43–75)
NRBC BLD AUTO-RTO: 0 /100 WBCS
PLATELET # BLD AUTO: 192 THOUSANDS/UL (ref 149–390)
PMV BLD AUTO: 10.4 FL (ref 8.9–12.7)
POTASSIUM SERPL-SCNC: 3.7 MMOL/L (ref 3.5–5.3)
PROT SERPL-MCNC: 7 G/DL (ref 6.4–8.4)
RBC # BLD AUTO: 4.56 MILLION/UL (ref 3.81–5.12)
SODIUM SERPL-SCNC: 138 MMOL/L (ref 135–147)
WBC # BLD AUTO: 6.68 THOUSAND/UL (ref 4.31–10.16)

## 2025-05-10 PROCEDURE — 99283 EMERGENCY DEPT VISIT LOW MDM: CPT

## 2025-05-10 PROCEDURE — 85025 COMPLETE CBC W/AUTO DIFF WBC: CPT | Performed by: EMERGENCY MEDICINE

## 2025-05-10 PROCEDURE — 96365 THER/PROPH/DIAG IV INF INIT: CPT

## 2025-05-10 PROCEDURE — 36415 COLL VENOUS BLD VENIPUNCTURE: CPT | Performed by: EMERGENCY MEDICINE

## 2025-05-10 PROCEDURE — 96375 TX/PRO/DX INJ NEW DRUG ADDON: CPT

## 2025-05-10 PROCEDURE — 99284 EMERGENCY DEPT VISIT MOD MDM: CPT | Performed by: EMERGENCY MEDICINE

## 2025-05-10 PROCEDURE — 96361 HYDRATE IV INFUSION ADD-ON: CPT

## 2025-05-10 PROCEDURE — 80053 COMPREHEN METABOLIC PANEL: CPT | Performed by: EMERGENCY MEDICINE

## 2025-05-10 RX ORDER — DICYCLOMINE HYDROCHLORIDE 10 MG/1
20 CAPSULE ORAL ONCE
Status: DISCONTINUED | OUTPATIENT
Start: 2025-05-10 | End: 2025-05-10 | Stop reason: HOSPADM

## 2025-05-10 RX ORDER — OXYCODONE HYDROCHLORIDE 5 MG/1
5 TABLET ORAL EVERY 6 HOURS PRN
Qty: 10 TABLET | Refills: 0 | Status: SHIPPED | OUTPATIENT
Start: 2025-05-10

## 2025-05-10 RX ORDER — ACETAMINOPHEN 10 MG/ML
1000 INJECTION, SOLUTION INTRAVENOUS ONCE
Status: COMPLETED | OUTPATIENT
Start: 2025-05-10 | End: 2025-05-10

## 2025-05-10 RX ORDER — OXYCODONE HYDROCHLORIDE 5 MG/1
5 TABLET ORAL ONCE
Refills: 0 | Status: COMPLETED | OUTPATIENT
Start: 2025-05-10 | End: 2025-05-10

## 2025-05-10 RX ORDER — ONDANSETRON 2 MG/ML
4 INJECTION INTRAMUSCULAR; INTRAVENOUS ONCE
Status: COMPLETED | OUTPATIENT
Start: 2025-05-10 | End: 2025-05-10

## 2025-05-10 RX ORDER — ONDANSETRON 4 MG/1
4 TABLET, ORALLY DISINTEGRATING ORAL EVERY 6 HOURS PRN
Qty: 10 TABLET | Refills: 0 | Status: SHIPPED | OUTPATIENT
Start: 2025-05-10

## 2025-05-10 RX ORDER — SODIUM CHLORIDE, SODIUM LACTATE, POTASSIUM CHLORIDE, CALCIUM CHLORIDE 600; 310; 30; 20 MG/100ML; MG/100ML; MG/100ML; MG/100ML
75 INJECTION, SOLUTION INTRAVENOUS CONTINUOUS
Status: CANCELLED | OUTPATIENT
Start: 2025-05-10

## 2025-05-10 RX ADMIN — ONDANSETRON 4 MG: 2 INJECTION INTRAMUSCULAR; INTRAVENOUS at 14:58

## 2025-05-10 RX ADMIN — ACETAMINOPHEN 1000 MG: 10 INJECTION INTRAVENOUS at 14:54

## 2025-05-10 RX ADMIN — OXYCODONE HYDROCHLORIDE 5 MG: 5 TABLET ORAL at 15:07

## 2025-05-10 RX ADMIN — SODIUM CHLORIDE 1000 ML: 0.9 INJECTION, SOLUTION INTRAVENOUS at 14:54

## 2025-05-10 NOTE — Clinical Note
Kimmy Whitaker was seen and treated in our emergency department on 5/10/2025.                Diagnosis:     Kimmy  .    She may return on this date: 05/17/2025         If you have any questions or concerns, please don't hesitate to call.      Hill Palacios MD    ______________________________           _______________          _______________  Hospital Representative                              Date                                Time

## 2025-05-10 NOTE — DISCHARGE INSTRUCTIONS
Use the new prescribed antibiotic twice a day for the next 10 days.  Use oxycodone as needed for any severe pain.  Make sure you are drinking enough fluids.  Use the prescribed Zofran as needed for nausea and vomiting.  If you have any severe worsening of pain, severe nausea and vomiting and are not able to drink fluids, return to the emergency department.  GI will plan to follow-up with you in the next week.

## 2025-05-10 NOTE — ED PROVIDER NOTES
Time reflects when diagnosis was documented in both MDM as applicable and the Disposition within this note       Time User Action Codes Description Comment    5/10/2025  4:03 PM Hill Palacios Add [K52.9] Colitis     5/10/2025  4:03 PM Hill Palacios Add [A04.72] C. difficile colitis     5/10/2025  4:05 PM Hill Palacios Add [R11.0] Nausea           ED Disposition       ED Disposition   Discharge    Condition   Stable    Date/Time   Sat May 10, 2025  4:05 PM    Comment   Kimmy Whitaker discharge to home/self care.                   Assessment & Plan       Medical Decision Making  Patient with recurrent episode of C. difficile colitis confirmed by outpatient testing performed yesterday.  Patient tachycardic by triage vitals but no external signs of dehydration, benign abdominal examination.  I ordered and reviewed lab work including CBC, CMP to evaluate for electrolyte or renal derangement, leukocytosis, anemia.  I treated patient with IV fluids, multimodal pain regiment.  I discussed patient with gastroenterology who recommends course of Pradaxa mixing.  Prescribed Zofran, oxycodone, Pradaxa Mixson, will plan to follow-up with gastroenterology later in the week, discharged with return precautions.    Amount and/or Complexity of Data Reviewed  Labs: ordered.    Risk  Prescription drug management.             Medications   sodium chloride 0.9 % bolus 1,000 mL (1,000 mL Intravenous New Bag 5/10/25 1454)   dicyclomine (BENTYL) capsule 20 mg (20 mg Oral Not Given 5/10/25 1502)   acetaminophen (Ofirmev) injection 1,000 mg (0 mg Intravenous Stopped 5/10/25 1522)   ondansetron (ZOFRAN) injection 4 mg (4 mg Intravenous Given 5/10/25 1458)   oxyCODONE (ROXICODONE) IR tablet 5 mg (5 mg Oral Given 5/10/25 1507)       ED Risk Strat Scores                    No data recorded                            History of Present Illness       Chief Complaint   Patient presents with    Evaluation of Abnormal Diagnostic  Test     Stool positive for c diff, recently treated for same with vanco. C/o severe abd pain/ cramps and nausea       Past Medical History:   Diagnosis Date    Asthma     Colitis       History reviewed. No pertinent surgical history.   Family History   Problem Relation Age of Onset    No Known Problems Mother     No Known Problems Father       Social History     Tobacco Use    Smoking status: Former     Current packs/day: 0.25     Types: Cigarettes    Smokeless tobacco: Current    Tobacco comments:     Vaping    Vaping Use    Vaping status: Every Day    Substances: Nicotine, Flavoring   Substance Use Topics    Alcohol use: Not Currently    Drug use: Yes     Types: Marijuana      E-Cigarette/Vaping    E-Cigarette Use Current Every Day User       E-Cigarette/Vaping Substances    Nicotine Yes     THC No     CBD No     Flavoring Yes     Other No     Unknown No       I have reviewed and agree with the history as documented.     Patient is a 24-year-old female with recent diagnosis of C. difficile presenting for evaluation of abdominal pain, bloody stool. Patient evaluated on 3/28/2025 for diarrhea, abdominal pain.  CT at that time demonstrated segmental colitis patient evaluated again in emergency department on/14/25, complaining of blood in stool, complaining of intense cramping.  Outpatient testing for C. difficile positive.  Patient started on a 10-day course of vancomycin apparently with significant improvement of symptoms per GI note on 4/29/2025.  Patient stating 2 to 3 weeks of worsening abdominal cramping, nausea, lightheadedness, chills, episodes of diarrhea too numerous to count.  Patient states the bowel movements are mucoid, blood-streaked.  Patient states that she continues to eat and drink despite the symptoms.        Review of Systems   Constitutional:  Negative for chills, fatigue and fever.   Respiratory:  Negative for cough and shortness of breath.    Gastrointestinal:  Positive for abdominal pain,  diarrhea and nausea. Negative for abdominal distention and vomiting.   Genitourinary:  Negative for flank pain and frequency.   Musculoskeletal:  Negative for arthralgias and myalgias.   Neurological:  Positive for light-headedness. Negative for weakness and numbness.   Psychiatric/Behavioral:  Negative for confusion.    All other systems reviewed and are negative.          Objective       ED Triage Vitals [05/10/25 1429]   Temperature Pulse Blood Pressure Respirations SpO2 Patient Position - Orthostatic VS   98.1 °F (36.7 °C) (!) 118 126/68 22 99 % Sitting      Temp Source Heart Rate Source BP Location FiO2 (%) Pain Score    Tympanic Monitor Right arm -- 10 - Worst Possible Pain      Vitals      Date and Time Temp Pulse SpO2 Resp BP Pain Score FACES Pain Rating User   05/10/25 1530 -- 76 100 % -- 107/55 -- -- SM   05/10/25 1508 -- 90 99 % 20 114/57 -- -- BASILIA   05/10/25 1507 -- -- -- -- -- 10 - Worst Possible Pain --    05/10/25 1429 98.1 °F (36.7 °C) 118 99 % 22 126/68 10 - Worst Possible Pain -- LS            Physical Exam  Vitals and nursing note reviewed.   Constitutional:       General: She is not in acute distress.     Appearance: Normal appearance. She is not ill-appearing, toxic-appearing or diaphoretic.      Comments: Well-appearing, nontoxic, nondistressed   HENT:      Head: Normocephalic and atraumatic.      Comments: Moist mucous membranes     Right Ear: External ear normal.      Left Ear: External ear normal.   Eyes:      General:         Right eye: No discharge.         Left eye: No discharge.   Cardiovascular:      Comments: Regular rate and rhythm, no murmurs rubs or gallops.  Extremities warm and well-perfused without mottling  Pulmonary:      Effort: No respiratory distress.      Comments: No increased work of breathing.  Speaking in complete sentences.  Lungs clear to auscultation bilaterally without wheezes, rales, rhonchi.  Satting 99% on room air indicating adequate oxygenation  Abdominal:       General: There is no distension.      Comments: Abdomen nondistended with normal bowel sounds.  Diffuse tenderness without focality.  No rigidity, rebound, guarding   Musculoskeletal:         General: No deformity.      Cervical back: Normal range of motion.   Skin:     Findings: No lesion or rash.   Neurological:      Mental Status: She is alert and oriented to person, place, and time. Mental status is at baseline.      Comments: AAO x 3.  Pleasant, interactive   Psychiatric:         Mood and Affect: Mood and affect normal.         Results Reviewed       Procedure Component Value Units Date/Time    POCT pregnancy, urine [[id number]]     Lab Status: No result     Comprehensive metabolic panel [[id number]]  (Abnormal) Collected: 05/10/25 1453    Lab Status: Final result Specimen: Blood from Arm, Left Updated: 05/10/25 1517     Sodium 138 mmol/L      Potassium 3.7 mmol/L      Chloride 108 mmol/L      CO2 20 mmol/L      ANION GAP 10 mmol/L      BUN 9 mg/dL      Creatinine 0.60 mg/dL      Glucose 91 mg/dL      Calcium 9.0 mg/dL      AST 24 U/L      ALT 33 U/L      Alkaline Phosphatase 32 U/L      Total Protein 7.0 g/dL      Albumin 4.4 g/dL      Total Bilirubin 0.41 mg/dL      eGFR 128 ml/min/1.73sq m     Narrative:      National Kid[employer] Foundation guidelines for Chronic Kidney Disease (CKD):     Stage 1 with normal or high GFR (GFR > 90 mL/min/1.73 square meters)    Stage 2 Mild CKD (GFR = 60-89 mL/min/1.73 square meters)    Stage 3A Moderate CKD (GFR = 45-59 mL/min/1.73 square meters)    Stage 3B Moderate CKD (GFR = 30-44 mL/min/1.73 square meters)    Stage 4 Severe CKD (GFR = 15-29 mL/min/1.73 square meters)    Stage 5 End Stage CKD (GFR <15 mL/min/1.73 square meters)  Note: GFR calculation is accurate only with a steady state creatinine    CBC and differential [[id number]] Collected: 05/10/25 1453    Lab Status: Final result Specimen: Blood from Arm, Left Updated: 05/10/25 1509     WBC 6.68 Thousand/uL      RBC  4.56 Million/uL      Hemoglobin 12.2 g/dL      Hematocrit 37.8 %      MCV 83 fL      MCH 26.8 pg      MCHC 32.3 g/dL      RDW 14.6 %      MPV 10.4 fL      Platelets 192 Thousands/uL      nRBC 0 /100 WBCs      Segmented % 67 %      Immature Grans % 0 %      Lymphocytes % 23 %      Monocytes % 9 %      Eosinophils Relative 1 %      Basophils Relative 0 %      Absolute Neutrophils 4.46 Thousands/µL      Absolute Immature Grans 0.02 Thousand/uL      Absolute Lymphocytes 1.50 Thousands/µL      Absolute Monocytes 0.59 Thousand/µL      Eosinophils Absolute 0.08 Thousand/µL      Basophils Absolute 0.03 Thousands/µL             No orders to display       Procedures    ED Medication and Procedure Management   Prior to Admission Medications   Prescriptions Last Dose Informant Patient Reported? Taking?   albuterol (PROVENTIL HFA,VENTOLIN HFA) 90 mcg/act inhaler   No No   Sig: Inhale 2 puffs every 4 (four) hours as needed for shortness of breath or wheezing   colesevelam (WELCHOL) 625 mg tablet   Yes No   Sig: Take 1,250 mg by mouth 2 (two) times a day with meals   dicyclomine (BENTYL) 20 mg tablet   Yes No   Sig: Take 20 mg by mouth 2 (two) times a day   famotidine (PEPCID) 20 mg tablet  Self No No   Sig: Take 1 tablet (20 mg total) by mouth 2 (two) times a day   Patient not taking: Reported on 4/11/2025   meclizine (ANTIVERT) 25 mg tablet  Self No No   Sig: Take 1 tablet (25 mg total) by mouth every 8 (eight) hours as needed for dizziness   Patient not taking: Reported on 5/7/2025   norethindrone-ethinyl estradiol (JUNEL FE 1/20) 1-20 MG-MCG per tablet  Self Yes No   Sig: Take 1 tablet by mouth daily   ondansetron (ZOFRAN-ODT) 4 mg disintegrating tablet  Self No No   Sig: Take 1 tablet (4 mg total) by mouth every 6 (six) hours as needed for nausea for up to 3 days   polyethylene glycol (Golytely) 4000 mL solution   No No   Sig: Take 4,000 mL by mouth once for 1 dose Take 4000 mL by mouth once for 1 dose. Use as directed       Facility-Administered Medications: None     Patient's Medications   Discharge Prescriptions    FIDAXOMICIN (DIFICID) TABLET    Take 1 tablet (200 mg total) by mouth 2 (two) times a day for 10 days       Start Date: 5/10/2025 End Date: 5/20/2025       Order Dose: 200 mg       Quantity: 20 tablet    Refills: 0    ONDANSETRON (ZOFRAN-ODT) 4 MG DISINTEGRATING TABLET    Take 1 tablet (4 mg total) by mouth every 6 (six) hours as needed for nausea or vomiting for up to 10 doses       Start Date: 5/10/2025 End Date: --       Order Dose: 4 mg       Quantity: 10 tablet    Refills: 0    OXYCODONE (ROXICODONE) 5 IMMEDIATE RELEASE TABLET    Take 1 tablet (5 mg total) by mouth every 6 (six) hours as needed for moderate pain for up to 10 doses Max Daily Amount: 20 mg       Start Date: 5/10/2025 End Date: --       Order Dose: 5 mg       Quantity: 10 tablet    Refills: 0     No discharge procedures on file.  ED SEPSIS DOCUMENTATION   Time reflects when diagnosis was documented in both MDM as applicable and the Disposition within this note       Time User Action Codes Description Comment    5/10/2025  4:03 PM Hill Palacios [K52.9] Colitis     5/10/2025  4:03 PM Hill Palacios [A04.72] C. difficile colitis     5/10/2025  4:05 PM Hill Palacios [R11.0] Nausea                  Hill Palacios MD  05/10/25 1610

## 2025-05-12 ENCOUNTER — HOSPITAL ENCOUNTER (OUTPATIENT)
Dept: GASTROENTEROLOGY | Facility: AMBULARY SURGERY CENTER | Age: 25
Setting detail: OUTPATIENT SURGERY
Discharge: HOME | End: 2025-05-12
Attending: NURSE PRACTITIONER
Payer: COMMERCIAL

## 2025-05-12 ENCOUNTER — ANESTHESIA (OUTPATIENT)
Dept: GASTROENTEROLOGY | Facility: AMBULARY SURGERY CENTER | Age: 25
End: 2025-05-12
Payer: COMMERCIAL

## 2025-05-12 ENCOUNTER — RESULTS FOLLOW-UP (OUTPATIENT)
Dept: GASTROENTEROLOGY | Facility: CLINIC | Age: 25
End: 2025-05-12

## 2025-05-12 VITALS
OXYGEN SATURATION: 100 % | SYSTOLIC BLOOD PRESSURE: 114 MMHG | HEART RATE: 72 BPM | DIASTOLIC BLOOD PRESSURE: 67 MMHG | RESPIRATION RATE: 16 BRPM | TEMPERATURE: 98.2 F

## 2025-05-12 DIAGNOSIS — R10.9 ABDOMINAL CRAMPING: ICD-10-CM

## 2025-05-12 DIAGNOSIS — A04.72 C. DIFFICILE COLITIS: Primary | ICD-10-CM

## 2025-05-12 DIAGNOSIS — R93.3 ABNORMAL CT SCAN, COLON: ICD-10-CM

## 2025-05-12 DIAGNOSIS — R19.7 DIARRHEA, UNSPECIFIED TYPE: ICD-10-CM

## 2025-05-12 LAB
EXT PREGNANCY TEST URINE: NEGATIVE
EXT. CONTROL: NORMAL
TTG IGA SER IA-ACNC: <0.4 U/ML (ref ?–10)

## 2025-05-12 PROCEDURE — 45380 COLONOSCOPY AND BIOPSY: CPT | Performed by: INTERNAL MEDICINE

## 2025-05-12 PROCEDURE — 81025 URINE PREGNANCY TEST: CPT | Performed by: ANESTHESIOLOGY

## 2025-05-12 PROCEDURE — 88305 TISSUE EXAM BY PATHOLOGIST: CPT | Performed by: PATHOLOGY

## 2025-05-12 RX ORDER — SODIUM CHLORIDE, SODIUM LACTATE, POTASSIUM CHLORIDE, CALCIUM CHLORIDE 600; 310; 30; 20 MG/100ML; MG/100ML; MG/100ML; MG/100ML
75 INJECTION, SOLUTION INTRAVENOUS CONTINUOUS
Status: DISCONTINUED | OUTPATIENT
Start: 2025-05-12 | End: 2025-05-16 | Stop reason: HOSPADM

## 2025-05-12 RX ORDER — VANCOMYCIN HYDROCHLORIDE 125 MG/1
125 CAPSULE ORAL 4 TIMES DAILY
Qty: 84 CAPSULE | Refills: 0 | Status: SHIPPED | OUTPATIENT
Start: 2025-05-12 | End: 2025-06-02

## 2025-05-12 RX ORDER — PROPOFOL 10 MG/ML
INJECTION, EMULSION INTRAVENOUS AS NEEDED
Status: DISCONTINUED | OUTPATIENT
Start: 2025-05-12 | End: 2025-05-12

## 2025-05-12 RX ORDER — PROPOFOL 10 MG/ML
INJECTION, EMULSION INTRAVENOUS CONTINUOUS PRN
Status: DISCONTINUED | OUTPATIENT
Start: 2025-05-12 | End: 2025-05-12

## 2025-05-12 RX ORDER — LIDOCAINE HYDROCHLORIDE 10 MG/ML
INJECTION, SOLUTION EPIDURAL; INFILTRATION; INTRACAUDAL; PERINEURAL AS NEEDED
Status: DISCONTINUED | OUTPATIENT
Start: 2025-05-12 | End: 2025-05-12

## 2025-05-12 RX ADMIN — PROPOFOL 100 MG: 10 INJECTION, EMULSION INTRAVENOUS at 12:42

## 2025-05-12 RX ADMIN — SODIUM CHLORIDE, SODIUM LACTATE, POTASSIUM CHLORIDE, AND CALCIUM CHLORIDE 75 ML/HR: .6; .31; .03; .02 INJECTION, SOLUTION INTRAVENOUS at 11:39

## 2025-05-12 RX ADMIN — PROPOFOL 70 MG: 10 INJECTION, EMULSION INTRAVENOUS at 12:48

## 2025-05-12 RX ADMIN — LIDOCAINE HYDROCHLORIDE 50 MG: 10 INJECTION, SOLUTION EPIDURAL; INFILTRATION; INTRACAUDAL; PERINEURAL at 12:42

## 2025-05-12 RX ADMIN — PROPOFOL 140 MCG/KG/MIN: 10 INJECTION, EMULSION INTRAVENOUS at 12:43

## 2025-05-12 NOTE — ANESTHESIA POSTPROCEDURE EVALUATION
Post-Op Assessment Note    CV Status:  Stable  Pain Score: 0    Pain management: adequate       Mental Status:  Sleepy   Hydration Status:  Stable   PONV Controlled:  None   Airway Patency:  Patent     Post Op Vitals Reviewed: Yes    No anethesia notable event occurred.    Staff: Anesthesiologist, CRNA           Last Filed PACU Vitals:  Vitals Value Taken Time   Temp     Pulse 70    /64    Resp 14    SpO2 98

## 2025-05-12 NOTE — INTERVAL H&P NOTE
H&P reviewed. After examining the patient I find no changes in the patients condition since the H&P had been written.    Vitals:    05/12/25 1135   BP: 115/69   Pulse: 74   Resp: 18   Temp: 98.2 °F (36.8 °C)   SpO2: 100%

## 2025-05-12 NOTE — ANESTHESIA PREPROCEDURE EVALUATION
Procedure:  COLONOSCOPY    Relevant Problems   PULMONARY   (+) Mild intermittent asthma without complication        Physical Exam    Airway    Mallampati score: II  TM Distance: >3 FB  Neck ROM: full     Dental       Cardiovascular  Rhythm: regular, Rate: normal    Pulmonary   Breath sounds clear to auscultation    Other Findings  post-pubertal.      Anesthesia Plan  ASA Score- 2     Anesthesia Type- IV sedation with anesthesia with ASA Monitors.         Additional Monitors:     Airway Plan:            Plan Factors-    Chart reviewed.        Patient is not a current smoker.              Induction- intravenous.    Postoperative Plan-     Perioperative Resuscitation Plan - Level 1 - Full Code.       Informed Consent- Anesthetic plan and risks discussed with patient.  I personally reviewed this patient with the CRNA. Discussed and agreed on the Anesthesia Plan with the CRNA..      NPO Status:  Vitals Value Taken Time   Date of last liquid 05/12/25 05/12/25 1119   Time of last liquid 0600 05/12/25 1119   Date of last solid 05/09/25 05/12/25 1119   Time of last solid 0900 05/12/25 1119

## 2025-05-15 ENCOUNTER — RESULTS FOLLOW-UP (OUTPATIENT)
Age: 25
End: 2025-05-15

## 2025-05-15 PROCEDURE — 88305 TISSUE EXAM BY PATHOLOGIST: CPT | Performed by: PATHOLOGY

## 2025-05-15 NOTE — RESULT ENCOUNTER NOTE
Please call the patient regarding her abnormal result.  Colon biopsies are most likely suggestive of resolving colitis.  Complete the treatment as we discussed after the procedure.  Follow up in GI office as needed

## 2025-05-30 ENCOUNTER — NURSE TRIAGE (OUTPATIENT)
Age: 25
End: 2025-05-30

## 2025-05-30 ENCOUNTER — TELEPHONE (OUTPATIENT)
Age: 25
End: 2025-05-30

## 2025-05-30 DIAGNOSIS — A04.72 C. DIFFICILE COLITIS: ICD-10-CM

## 2025-05-30 RX ORDER — VANCOMYCIN HYDROCHLORIDE 125 MG/1
CAPSULE ORAL
Qty: 93 CAPSULE | Refills: 0 | Status: SHIPPED | OUTPATIENT
Start: 2025-05-30 | End: 2025-07-29

## 2025-05-30 NOTE — TELEPHONE ENCOUNTER
Patient called, asking if provider had called her. Advised she had not yet and that she was seeing patients. Advised I would send a note that she was waiting for response.  [phone]

## 2025-05-30 NOTE — TELEPHONE ENCOUNTER
Spoke to patient. She reports finishing 2nd course of Vancomycin on 5/22. She had formed stool 3x daily for about a week until symptoms returned yesterday evening. She was out of work today. Will send Vancomycin taper to her pharmacy. Recommend staying well hydrated & following a low residue, bland diet, avoid sugary/fatty foods, minimize dairy intake. ED precautions discussed. Work note sent via [employer]. She will update us regarding her symptoms again on Monday.

## 2025-05-30 NOTE — TELEPHONE ENCOUNTER
"  REASON FOR CONVERSATION: Diarrhea    SYMPTOMS: Last night diarrhea started back with mild abdominal pain.    OTHER HEALTH INFORMATION: recently treated for c diff and finished vanco last week- symptoms resolved until last night   Bentyl does not help   PROTOCOL DISPOSITION: Callback by PCP Today    CARE ADVICE PROVIDED: Stay hydrated, bland diet and will defer recs to provider     PRACTICE FOLLOW-UP: please advise, pt frustrated with recurrent symptoms           Reason for Disposition   Recent antibiotic therapy (i.e., within last 2 months) and diarrhea present > 3 days since antibiotic was stopped    Answer Assessment - Initial Assessment Questions  1. DIARRHEA SEVERITY: \"How bad is the diarrhea?\" \"How many more stools have you had in the past 24 hours than normal?\"       Started last night but had c diff recently   2. ONSET: \"When did the diarrhea begin?\"       Last night   3. STOOL DESCRIPTION:  \"How loose or watery is the diarrhea?\" \"What is the stool color?\" \"Is there any blood or mucous in the stool?\"      loose  4. VOMITING: \"Are you also vomiting?\" If Yes, ask: \"How many times in the past 24 hours?\"       No   5. ABDOMEN PAIN: \"Are you having any abdomen pain?\" If Yes, ask: \"What does it feel like?\" (e.g., crampy, dull, intermittent, constant)       A little   6. ABDOMEN PAIN SEVERITY: If present, ask: \"How bad is the pain?\"  (e.g., Scale 1-10; mild, moderate, or severe)      Mild   7. ORAL INTAKE: If vomiting, \"Have you been able to drink liquids?\" \"How much liquids hav you had in the past 24 hours?\"      No vomiting   8. HYDRATION: \"Any signs of dehydration?\" (e.g., dry mouth [not just dry lips], too weak to stand, dizziness, new weight loss) \"When did you last urinate?\"      No   9. EXPOSURE: \"Have you traveled to a foreign country recently?\" \"Have you been exposed to anyone with diarrhea?\" \"Could you have eaten any food that was spoiled?\"        10. ANTIBIOTIC USE: \"Are you taking antibiotics now or " "have you taken antibiotics in the past 2 months?\"        Yes vanco finished course last week  11. OTHER SYMPTOMS: \"Do you have any other symptoms?\" (e.g., fever, blood in stool)        No    Protocols used: Diarrhea-Adult-[place]    "

## 2025-06-12 DIAGNOSIS — A04.72 C. DIFFICILE COLITIS: ICD-10-CM

## 2025-06-13 RX ORDER — VANCOMYCIN HYDROCHLORIDE 125 MG/1
CAPSULE ORAL
Qty: 93 CAPSULE | Refills: 0 | OUTPATIENT
Start: 2025-06-13

## 2025-06-13 NOTE — TELEPHONE ENCOUNTER
"Spoke with patient, she is still taking vancomycin, states she is \"better\". Her stools are soft formed.  "

## 2025-06-19 ENCOUNTER — TELEPHONE (OUTPATIENT)
Age: 25
End: 2025-06-19

## 2025-06-19 NOTE — TELEPHONE ENCOUNTER
Pt calling in her obgyn put her on macrobid 1 capsules every 12 hours for 5 days for a UTI. Pt is asking if this is okay to start she is still on vanco taper dose for another week for cdiff. Please advise

## 2025-07-05 ENCOUNTER — NURSE TRIAGE (OUTPATIENT)
Dept: OTHER | Facility: OTHER | Age: 25
End: 2025-07-05

## 2025-07-05 DIAGNOSIS — A04.72 C. DIFFICILE COLITIS: ICD-10-CM

## 2025-07-05 RX ORDER — VANCOMYCIN HYDROCHLORIDE 125 MG/1
CAPSULE ORAL
Qty: 93 CAPSULE | Refills: 0 | Status: SHIPPED | OUTPATIENT
Start: 2025-07-05 | End: 2025-09-03

## 2025-07-05 NOTE — TELEPHONE ENCOUNTER
"REASON FOR CONVERSATION: Medication Refill        Reason for Disposition   [1] Prescription refill request for ESSENTIAL medicine (i.e., likelihood of harm to patient if not taken) AND [2] triager unable to refill per department policy    Answer Assessment - Initial Assessment Questions  1. DRUG NAME: \"What medicine do you need to have refilled?\"        vancomycin    4. PRESCRIBING HCP: \"Who prescribed it?\" Reason: If prescribed by specialist, call should be referred to that group.        GI     5. SYMPTOMS: \"Do you have any symptoms?\"        diarrhea      Patient calling for refill was on and just stopped 3 days ago and diarrhea is back. Has already been on vancomycin for 3 times     [place], [place]    Protocols used: Medication Refill and Renewal Call-Adult-    "

## 2025-07-05 NOTE — TELEPHONE ENCOUNTER
"Regarding: off vancomycin for 3 days diarrhea came back  ----- Message from Lidia ROBERTS sent at 7/5/2025 11:25 AM EDT -----  Patient stated, \"I've been off the vancomycin for 3 days and the diarrhea has come back. I tried getting a refill and the pharmacy says I have none.\"    "

## 2025-07-05 NOTE — PROGRESS NOTES
Patient called in reporting recurrence of diarrhea 3 days after stopping vancomycin.  Will prescribe prolonged taper again and send message to the office for consideration of Vowst current infections though she has been on recent antibiotics for UTIs as well.

## 2025-07-16 ENCOUNTER — TELEPHONE (OUTPATIENT)
Age: 25
End: 2025-07-16

## 2025-07-16 DIAGNOSIS — A04.71 RECURRENT CLOSTRIDIUM DIFFICILE DIARRHEA: Primary | ICD-10-CM

## 2025-07-16 RX ORDER — FECAL MICROBIOTA SPORES, LIVE-BRPK 30000000 [CFU]/1
CAPSULE ORAL
Qty: 12 CAPSULE | Refills: 0 | Status: SHIPPED | OUTPATIENT
Start: 2025-07-16

## 2025-07-16 RX ORDER — MAGNESIUM CARB/ALUMINUM HYDROX 105-160MG
296 TABLET,CHEWABLE ORAL ONCE
Qty: 296 ML | Refills: 0 | Status: SHIPPED | OUTPATIENT
Start: 2025-07-16 | End: 2025-07-16

## 2025-07-16 NOTE — TELEPHONE ENCOUNTER
Pt with history of recurrent C.diff after completing 10 day course of Vancomycin x2, Vanomycin taper x1, and had recurrent symptoms a few days after stopping antibiotics so was prescribed another vancomycin taper 7/5. She should be finished with this Vancomycin taper 9/3/25. At which point I would recommend Vowst for prevention of recurrent C.diff infection.     2 days after she completes antibiotic she is to take a 10 oz bottle of magnesium citrate for a bowel cleanse and then fast for 8 hours overnight, then in the morning the next day she will start taking 4 Vowst capsules daily on an empty stomach for 3 consecutive days.    The chart notes she has an allergy to an ingredient in blueberry flavoring (ethanol) which is also used in Magnesium citrate so if she is unable to tolerate alcohol without hives, then she can take 128 oz of Miralax/gatorade bowel prep instead.     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

## 2025-07-16 NOTE — TELEPHONE ENCOUNTER
Spoke with patient and reviewed recommendations from [facility]. She verbalized understanding. She will let us know when her antibiotic treatment is done  and to review next treatment.